# Patient Record
Sex: FEMALE | Race: OTHER | HISPANIC OR LATINO | URBAN - METROPOLITAN AREA
[De-identification: names, ages, dates, MRNs, and addresses within clinical notes are randomized per-mention and may not be internally consistent; named-entity substitution may affect disease eponyms.]

---

## 2017-04-21 ENCOUNTER — EMERGENCY (EMERGENCY)
Facility: HOSPITAL | Age: 47
LOS: 1 days | Discharge: HOME | End: 2017-04-21
Attending: EMERGENCY MEDICINE
Payer: MEDICAID

## 2017-04-21 VITALS
HEIGHT: 61 IN | TEMPERATURE: 98 F | WEIGHT: 138.01 LBS | HEART RATE: 71 BPM | RESPIRATION RATE: 18 BRPM | SYSTOLIC BLOOD PRESSURE: 100 MMHG | DIASTOLIC BLOOD PRESSURE: 76 MMHG | OXYGEN SATURATION: 98 %

## 2017-04-21 DIAGNOSIS — Z98.82 BREAST IMPLANT STATUS: Chronic | ICD-10-CM

## 2017-04-21 PROCEDURE — 99282 EMERGENCY DEPT VISIT SF MDM: CPT

## 2017-04-21 PROCEDURE — 93005 ELECTROCARDIOGRAM TRACING: CPT

## 2017-04-21 PROCEDURE — 99283 EMERGENCY DEPT VISIT LOW MDM: CPT

## 2017-04-21 NOTE — ED PROVIDER NOTE - OBJECTIVE STATEMENT
46 y/o F pt with PMHx of Heart Murmur and no PSHx presents to ED c/o non-productive cough, congestion, diarrhea, and general body aches x1 week. Pt reports that she cannot stop coughing, and every time she coughs, she has pain in her chest and head. Pt denies fever, chills, abd pain, or any other complaints. NKDA.

## 2017-04-21 NOTE — ED PROVIDER NOTE - MEDICAL DECISION MAKING DETAILS
48 y/o F pt c/o non-productive cough, congestion, diarrhea, and general body aches x1 week. Likely viral. Plan to give Ibuprofen, instruct on supportive care, and d/c home.

## 2017-04-21 NOTE — ED PROVIDER NOTE - NS ED MD SCRIBE ATTENDING SCRIBE SECTIONS
PAST MEDICAL/SURGICAL/SOCIAL HISTORY/VITAL SIGNS( Pullset)/HISTORY OF PRESENT ILLNESS/DISPOSITION/REVIEW OF SYSTEMS/HIV/PHYSICAL EXAM

## 2017-04-24 DIAGNOSIS — R19.7 DIARRHEA, UNSPECIFIED: ICD-10-CM

## 2017-04-24 DIAGNOSIS — B34.9 VIRAL INFECTION, UNSPECIFIED: ICD-10-CM

## 2019-12-31 ENCOUNTER — EMERGENCY (EMERGENCY)
Facility: HOSPITAL | Age: 49
LOS: 1 days | Discharge: HOME | End: 2019-12-31
Attending: EMERGENCY MEDICINE
Payer: MEDICAID

## 2019-12-31 VITALS
RESPIRATION RATE: 17 BRPM | SYSTOLIC BLOOD PRESSURE: 104 MMHG | TEMPERATURE: 99 F | OXYGEN SATURATION: 99 % | DIASTOLIC BLOOD PRESSURE: 67 MMHG | HEART RATE: 90 BPM

## 2019-12-31 VITALS
OXYGEN SATURATION: 96 % | TEMPERATURE: 97 F | HEART RATE: 92 BPM | HEIGHT: 60 IN | DIASTOLIC BLOOD PRESSURE: 71 MMHG | SYSTOLIC BLOOD PRESSURE: 110 MMHG | WEIGHT: 147.93 LBS | RESPIRATION RATE: 16 BRPM

## 2019-12-31 DIAGNOSIS — Z98.82 BREAST IMPLANT STATUS: Chronic | ICD-10-CM

## 2019-12-31 PROBLEM — R01.1 CARDIAC MURMUR, UNSPECIFIED: Chronic | Status: ACTIVE | Noted: 2017-04-21

## 2019-12-31 LAB
ALBUMIN SERPL ELPH-MCNC: 3.5 G/DL — SIGNIFICANT CHANGE UP (ref 3.5–5)
ALP SERPL-CCNC: 84 U/L — SIGNIFICANT CHANGE UP (ref 40–120)
ALT FLD-CCNC: 23 U/L DA — SIGNIFICANT CHANGE UP (ref 10–60)
ANION GAP SERPL CALC-SCNC: 5 MMOL/L — SIGNIFICANT CHANGE UP (ref 5–17)
APPEARANCE UR: CLEAR — SIGNIFICANT CHANGE UP
APTT BLD: 30.4 SEC — SIGNIFICANT CHANGE UP (ref 27.5–36.3)
AST SERPL-CCNC: 23 U/L — SIGNIFICANT CHANGE UP (ref 10–40)
BILIRUB SERPL-MCNC: 0.3 MG/DL — SIGNIFICANT CHANGE UP (ref 0.2–1.2)
BILIRUB UR-MCNC: NEGATIVE — SIGNIFICANT CHANGE UP
BUN SERPL-MCNC: 6 MG/DL — LOW (ref 7–18)
CALCIUM SERPL-MCNC: 8.8 MG/DL — SIGNIFICANT CHANGE UP (ref 8.4–10.5)
CHLORIDE SERPL-SCNC: 105 MMOL/L — SIGNIFICANT CHANGE UP (ref 96–108)
CO2 SERPL-SCNC: 29 MMOL/L — SIGNIFICANT CHANGE UP (ref 22–31)
COLOR SPEC: YELLOW — SIGNIFICANT CHANGE UP
CREAT SERPL-MCNC: 0.92 MG/DL — SIGNIFICANT CHANGE UP (ref 0.5–1.3)
D DIMER BLD IA.RAPID-MCNC: <150 NG/ML DDU — SIGNIFICANT CHANGE UP
DIFF PNL FLD: NEGATIVE — SIGNIFICANT CHANGE UP
GLUCOSE SERPL-MCNC: 90 MG/DL — SIGNIFICANT CHANGE UP (ref 70–99)
GLUCOSE UR QL: NEGATIVE — SIGNIFICANT CHANGE UP
HCG UR QL: NEGATIVE — SIGNIFICANT CHANGE UP
HCT VFR BLD CALC: 38.1 % — SIGNIFICANT CHANGE UP (ref 34.5–45)
HGB BLD-MCNC: 12 G/DL — SIGNIFICANT CHANGE UP (ref 11.5–15.5)
INR BLD: 1 RATIO — SIGNIFICANT CHANGE UP (ref 0.88–1.16)
KETONES UR-MCNC: NEGATIVE — SIGNIFICANT CHANGE UP
LEUKOCYTE ESTERASE UR-ACNC: NEGATIVE — SIGNIFICANT CHANGE UP
MCHC RBC-ENTMCNC: 29.6 PG — SIGNIFICANT CHANGE UP (ref 27–34)
MCHC RBC-ENTMCNC: 31.5 GM/DL — LOW (ref 32–36)
MCV RBC AUTO: 94.1 FL — SIGNIFICANT CHANGE UP (ref 80–100)
NITRITE UR-MCNC: NEGATIVE — SIGNIFICANT CHANGE UP
NRBC # BLD: 0 /100 WBCS — SIGNIFICANT CHANGE UP (ref 0–0)
NT-PROBNP SERPL-SCNC: 103 PG/ML — SIGNIFICANT CHANGE UP (ref 0–125)
PH UR: 7 — SIGNIFICANT CHANGE UP (ref 5–8)
PLATELET # BLD AUTO: 195 K/UL — SIGNIFICANT CHANGE UP (ref 150–400)
POTASSIUM SERPL-MCNC: 3.9 MMOL/L — SIGNIFICANT CHANGE UP (ref 3.5–5.3)
POTASSIUM SERPL-SCNC: 3.9 MMOL/L — SIGNIFICANT CHANGE UP (ref 3.5–5.3)
PROT SERPL-MCNC: 7.2 G/DL — SIGNIFICANT CHANGE UP (ref 6–8.3)
PROT UR-MCNC: NEGATIVE — SIGNIFICANT CHANGE UP
PROTHROM AB SERPL-ACNC: 11.1 SEC — SIGNIFICANT CHANGE UP (ref 10–12.9)
RBC # BLD: 4.05 M/UL — SIGNIFICANT CHANGE UP (ref 3.8–5.2)
RBC # FLD: 13.4 % — SIGNIFICANT CHANGE UP (ref 10.3–14.5)
SODIUM SERPL-SCNC: 139 MMOL/L — SIGNIFICANT CHANGE UP (ref 135–145)
SP GR SPEC: 1 — LOW (ref 1.01–1.02)
TROPONIN I SERPL-MCNC: <0.015 NG/ML — SIGNIFICANT CHANGE UP (ref 0–0.04)
UROBILINOGEN FLD QL: NEGATIVE — SIGNIFICANT CHANGE UP
WBC # BLD: 3.31 K/UL — LOW (ref 3.8–10.5)
WBC # FLD AUTO: 3.31 K/UL — LOW (ref 3.8–10.5)

## 2019-12-31 PROCEDURE — 85730 THROMBOPLASTIN TIME PARTIAL: CPT

## 2019-12-31 PROCEDURE — 81003 URINALYSIS AUTO W/O SCOPE: CPT

## 2019-12-31 PROCEDURE — 84484 ASSAY OF TROPONIN QUANT: CPT

## 2019-12-31 PROCEDURE — 99284 EMERGENCY DEPT VISIT MOD MDM: CPT

## 2019-12-31 PROCEDURE — 94640 AIRWAY INHALATION TREATMENT: CPT

## 2019-12-31 PROCEDURE — 81025 URINE PREGNANCY TEST: CPT

## 2019-12-31 PROCEDURE — 85379 FIBRIN DEGRADATION QUANT: CPT

## 2019-12-31 PROCEDURE — 99284 EMERGENCY DEPT VISIT MOD MDM: CPT | Mod: 25

## 2019-12-31 PROCEDURE — 83880 ASSAY OF NATRIURETIC PEPTIDE: CPT

## 2019-12-31 PROCEDURE — 71046 X-RAY EXAM CHEST 2 VIEWS: CPT

## 2019-12-31 PROCEDURE — 36415 COLL VENOUS BLD VENIPUNCTURE: CPT

## 2019-12-31 PROCEDURE — 85027 COMPLETE CBC AUTOMATED: CPT

## 2019-12-31 PROCEDURE — 80053 COMPREHEN METABOLIC PANEL: CPT

## 2019-12-31 PROCEDURE — 93005 ELECTROCARDIOGRAM TRACING: CPT

## 2019-12-31 PROCEDURE — 85610 PROTHROMBIN TIME: CPT

## 2019-12-31 PROCEDURE — 71046 X-RAY EXAM CHEST 2 VIEWS: CPT | Mod: 26

## 2019-12-31 RX ORDER — SODIUM CHLORIDE 9 MG/ML
1000 INJECTION INTRAMUSCULAR; INTRAVENOUS; SUBCUTANEOUS ONCE
Refills: 0 | Status: COMPLETED | OUTPATIENT
Start: 2019-12-31 | End: 2019-12-31

## 2019-12-31 RX ORDER — CLARITHROMYCIN 500 MG
1 TABLET ORAL
Qty: 4 | Refills: 0
Start: 2019-12-31 | End: 2020-01-03

## 2019-12-31 RX ORDER — AZITHROMYCIN 500 MG/1
1 TABLET, FILM COATED ORAL
Qty: 4 | Refills: 0
Start: 2019-12-31 | End: 2020-01-03

## 2019-12-31 RX ORDER — AZITHROMYCIN 500 MG/1
500 TABLET, FILM COATED ORAL ONCE
Refills: 0 | Status: COMPLETED | OUTPATIENT
Start: 2019-12-31 | End: 2019-12-31

## 2019-12-31 RX ORDER — IPRATROPIUM/ALBUTEROL SULFATE 18-103MCG
3 AEROSOL WITH ADAPTER (GRAM) INHALATION ONCE
Refills: 0 | Status: COMPLETED | OUTPATIENT
Start: 2019-12-31 | End: 2019-12-31

## 2019-12-31 RX ADMIN — SODIUM CHLORIDE 1000 MILLILITER(S): 9 INJECTION INTRAMUSCULAR; INTRAVENOUS; SUBCUTANEOUS at 17:54

## 2019-12-31 RX ADMIN — Medication 200 MILLIGRAM(S): at 17:51

## 2019-12-31 RX ADMIN — AZITHROMYCIN 500 MILLIGRAM(S): 500 TABLET, FILM COATED ORAL at 17:53

## 2019-12-31 RX ADMIN — Medication 3 MILLILITER(S): at 17:51

## 2019-12-31 NOTE — ED PROVIDER NOTE - CLINICAL SUMMARY MEDICAL DECISION MAKING FREE TEXT BOX
50 y/o F presents to ED complaining of cough with blood tinged sputum and chest pain. Will give labs, coagulations, chest x-ray and will reassess.

## 2019-12-31 NOTE — ED ADULT NURSE NOTE - NSIMPLEMENTINTERV_GEN_ALL_ED
Implemented All Fall Risk Interventions:  [place] to call system. Call bell, personal items and telephone within reach. Instruct patient to call for assistance. Room bathroom lighting operational. Non-slip footwear when patient is off stretcher. Physically safe environment: no spills, clutter or unnecessary equipment. Stretcher in lowest position, wheels locked, appropriate side rails in place. Provide visual cue, wrist band, yellow gown, etc. Monitor gait and stability. Monitor for mental status changes and reorient to person, place, and time. Review medications for side effects contributing to fall risk. Reinforce activity limits and safety measures with patient and family.

## 2019-12-31 NOTE — ED PROVIDER NOTE - PROGRESS NOTE DETAILS
Pt is not hypoxic, not tachy, possible small retrocardiac infiltrate, will dc to follow up with pulmonologist. Precautions reviewed. Pt is not hypoxic, not tachy, xray noted,, will dc to follow up with pulmonologist. Precautions reviewed.

## 2019-12-31 NOTE — ED PROVIDER NOTE - PATIENT PORTAL LINK FT
You can access the FollowMyHealth Patient Portal offered by [facility] by registering at the following website: [email or web address]. By joining [facility]’s FollowMyHealth portal, you will also be able to view your health information using other applications (apps) compatible with our system.

## 2019-12-31 NOTE — ED PROVIDER NOTE - NS ED SCRIBE STATEMENT
Patient called to set up a follow up appointment with Dr. Weeks as well as asking him if there is anything that she could take to stop the ringing in her ears. She was notified that he was out on leave and that we would have to give her a call back regarding her scheduling options     Attending

## 2020-01-01 ENCOUNTER — OUTPATIENT (OUTPATIENT)
Dept: OUTPATIENT SERVICES | Facility: HOSPITAL | Age: 50
LOS: 1 days | End: 2020-01-01
Payer: MEDICAID

## 2020-01-01 DIAGNOSIS — Z98.82 BREAST IMPLANT STATUS: Chronic | ICD-10-CM

## 2020-01-01 PROCEDURE — G9001: CPT

## 2020-01-08 DIAGNOSIS — Z71.89 OTHER SPECIFIED COUNSELING: ICD-10-CM

## 2020-02-16 ENCOUNTER — EMERGENCY (EMERGENCY)
Facility: HOSPITAL | Age: 50
LOS: 1 days | Discharge: HOME | End: 2020-02-16
Attending: EMERGENCY MEDICINE
Payer: MEDICAID

## 2020-02-16 VITALS
DIASTOLIC BLOOD PRESSURE: 72 MMHG | SYSTOLIC BLOOD PRESSURE: 115 MMHG | TEMPERATURE: 98 F | OXYGEN SATURATION: 98 % | RESPIRATION RATE: 16 BRPM | HEART RATE: 74 BPM

## 2020-02-16 VITALS
WEIGHT: 139.99 LBS | RESPIRATION RATE: 16 BRPM | SYSTOLIC BLOOD PRESSURE: 110 MMHG | OXYGEN SATURATION: 99 % | HEART RATE: 91 BPM | DIASTOLIC BLOOD PRESSURE: 78 MMHG | TEMPERATURE: 98 F | HEIGHT: 61 IN

## 2020-02-16 DIAGNOSIS — Z98.82 BREAST IMPLANT STATUS: Chronic | ICD-10-CM

## 2020-02-16 LAB — HCG UR QL: NEGATIVE — SIGNIFICANT CHANGE UP

## 2020-02-16 PROCEDURE — 72110 X-RAY EXAM L-2 SPINE 4/>VWS: CPT | Mod: 26

## 2020-02-16 PROCEDURE — 99284 EMERGENCY DEPT VISIT MOD MDM: CPT

## 2020-02-16 PROCEDURE — 72110 X-RAY EXAM L-2 SPINE 4/>VWS: CPT

## 2020-02-16 PROCEDURE — 81025 URINE PREGNANCY TEST: CPT

## 2020-02-16 PROCEDURE — 99283 EMERGENCY DEPT VISIT LOW MDM: CPT | Mod: 25

## 2020-02-16 PROCEDURE — 96372 THER/PROPH/DIAG INJ SC/IM: CPT

## 2020-02-16 RX ORDER — LIDOCAINE 4 G/100G
1 CREAM TOPICAL ONCE
Refills: 0 | Status: COMPLETED | OUTPATIENT
Start: 2020-02-16 | End: 2020-02-16

## 2020-02-16 RX ORDER — CYCLOBENZAPRINE HYDROCHLORIDE 10 MG/1
1 TABLET, FILM COATED ORAL
Qty: 9 | Refills: 0
Start: 2020-02-16 | End: 2020-02-18

## 2020-02-16 RX ORDER — IBUPROFEN 200 MG
1 TABLET ORAL
Qty: 12 | Refills: 0
Start: 2020-02-16 | End: 2020-02-18

## 2020-02-16 RX ORDER — KETOROLAC TROMETHAMINE 30 MG/ML
15 SYRINGE (ML) INJECTION ONCE
Refills: 0 | Status: DISCONTINUED | OUTPATIENT
Start: 2020-02-16 | End: 2020-02-16

## 2020-02-16 RX ORDER — CYCLOBENZAPRINE HYDROCHLORIDE 10 MG/1
10 TABLET, FILM COATED ORAL ONCE
Refills: 0 | Status: COMPLETED | OUTPATIENT
Start: 2020-02-16 | End: 2020-02-16

## 2020-02-16 RX ORDER — ACETAMINOPHEN 500 MG
650 TABLET ORAL ONCE
Refills: 0 | Status: COMPLETED | OUTPATIENT
Start: 2020-02-16 | End: 2020-02-16

## 2020-02-16 RX ADMIN — Medication 15 MILLIGRAM(S): at 16:10

## 2020-02-16 RX ADMIN — LIDOCAINE 1 PATCH: 4 CREAM TOPICAL at 15:54

## 2020-02-16 RX ADMIN — Medication 15 MILLIGRAM(S): at 15:55

## 2020-02-16 RX ADMIN — Medication 650 MILLIGRAM(S): at 15:55

## 2020-02-16 RX ADMIN — CYCLOBENZAPRINE HYDROCHLORIDE 10 MILLIGRAM(S): 10 TABLET, FILM COATED ORAL at 15:55

## 2020-02-16 RX ADMIN — Medication 650 MILLIGRAM(S): at 16:00

## 2020-02-16 NOTE — ED PROVIDER NOTE - PHYSICAL EXAMINATION
back: Paraspinal tenderness to lumbar region, worse on left.  Able to ambulate with assistance.  Pain worse with right leg strait raise.

## 2020-02-16 NOTE — ED PROVIDER NOTE - CLINICAL SUMMARY MEDICAL DECISION MAKING FREE TEXT BOX
49 year old female no significant past medical history presents with lower back pain 2 days after falling onto back.  Will do XR lumbar spine, meds, reassess.

## 2020-02-16 NOTE — ED PROVIDER NOTE - ATTENDING CONTRIBUTION TO CARE
I completed an independent physical examination.   I have signed out the follow up of any pending tests (i.e. labs, radiological studies) to the PA/NP.  I have discussed the patient’s plan of care and disposition with the PA/NP    Lower back pain s/p fall. X-ray, analgesia, reassess.

## 2020-02-16 NOTE — ED PROVIDER NOTE - OBJECTIVE STATEMENT
49 year old female no significant past medical history presents with lower back pain 2 days after falling onto back.  Patient states she tripped in a crack in the side walk, tried to steady herself with her lower back, strained it then fell onto her lower back.  Patient denies hitting head or neck pain.  Patient states that pain has been progressing in intensity and is now having difficulty ambulating 2/2 pain. Patient denies loss of bladder of bowel function, denies fevers or chills and denies saddle anesthesia.  States that she intermittently has numbness going down both legs.

## 2020-02-16 NOTE — ED PROVIDER NOTE - PATIENT PORTAL LINK FT
You can access the FollowMyHealth Patient Portal offered by [facility] by registering at the following website: [email or web address]. By joining [facility]’s FollowMyHealth portal, you will also be able to view your health information using other applications (apps) compatible with our system.

## 2020-02-16 NOTE — ED ADULT NURSE NOTE - OBJECTIVE STATEMENT
Pt c/o back pain s/p trip and fall 2 days ago, denies any LOC. No acute distress noted, denies chest pain, no shortness of breath indicated. Safety maintained.

## 2020-12-16 NOTE — ED PROVIDER NOTE - RADIATION
Letter of medical necessity for [place] upgrade was written by Stephanie Moncada and signed by Bertha Patten MD.  I sent the letter to MED  today on behalf of Dr. Hill.      Demarco Miller, CCC-A, [facility]  Licensed Audiologist  [place] #[id number]    
no radiation

## 2022-11-07 NOTE — ED ADULT TRIAGE NOTE - BP NONINVASIVE DIASTOLIC (MM HG)
Can you let him know that his blood work shows anemia is improving. He has moderate iron deficiency despite the fact that he has been taking oral iron. I would recommend him to get one dose of feraheme and have virtual visit with me in 3 months with CBC, ferritin, TIBC and retic couple of days prior.   Plan for feraheme placed.     Thank you.   Reanna Smith MD  [address]

## 2024-04-24 NOTE — ED ADULT TRIAGE NOTE - RELATIONSHIP TO PATIENT
[facility]        NAME: Jennyfer Glez is a 19 y.o. female  : 2005    MRN: [id number]  DATE: 2024  TIME: 6:59 PM    Assessment and Plan   Viral infection [B34.9]  1. Viral infection  methylPREDNISolone 4 MG tablet therapy pack    ondansetron (ZOFRAN) 4 mg tablet            Patient Instructions     Discussed BRAT diet. Recommended maintaining hydration. Steroids given for headaches and muscle aches. Zofran for nausea. Follow-up with PCP in the next 1-2 days for re-evaluation if symptoms continue or worsen .Go to the ED if any fevers, malaise, new or worsening symptoms or other concerning symptoms.     Chief Complaint     Chief Complaint   Patient presents with   • Flu Symptoms     Pt reports that she has been feeling sick for about 3 days. She stated that she has been feeling nausea/body aches/fevers/chills/ and she has taken OTC meds.          History of Present Illness     Jennyfer Glez is a 19 y.o. female presenting to the office today for abdominal pain. Symptoms have been present for 3 days, and include headaches, muscle aches, epigastric pain, nausea, vomiting and fatigue. She is unsure if she is having fevers.     Review of Systems     Review of Systems   Constitutional:  Positive for fatigue. Negative for chills and fever.   HENT:  Positive for congestion. Negative for postnasal drip and sore throat.    Respiratory:  Negative for cough and shortness of breath.    Cardiovascular:  Negative for chest pain and palpitations.   Gastrointestinal:  Positive for abdominal pain, nausea and vomiting. Negative for blood in stool, constipation and diarrhea.   Genitourinary:  Negative for dysuria.   Musculoskeletal:  Positive for arthralgias and myalgias. Negative for back pain, neck pain and neck stiffness.   Skin:  Negative for rash.   Allergic/Immunologic: Negative for food allergies.   Neurological:  Positive for headaches. Negative for dizziness, syncope and light-headedness.    Psychiatric/Behavioral:  Negative for agitation and confusion.    All other systems reviewed and are negative.      Current Medications       Current Outpatient Medications:   •  Levonorgestrel (MIRENA) 20 MCG/DAY IUD, 1 each by Intrauterine Device route Once every 8 years, Disp: , Rfl:   •  methylPREDNISolone 4 MG tablet therapy pack, Use as directed on package, Disp: 21 tablet, Rfl: 0  •  ondansetron (ZOFRAN) 4 mg tablet, Take 1 tablet (4 mg total) by mouth every 8 (eight) hours as needed for nausea or vomiting, Disp: 20 tablet, Rfl: 0    Current Allergies     Allergies as of 2024   • (No Known Allergies)            The following portions of the patient's history were reviewed and updated as appropriate: allergies, current medications, past family history, past medical history, past social history, past surgical history and problem list.     History reviewed. No pertinent past medical history.    Past Surgical History:   Procedure Laterality Date   •  SECTION  2021       History reviewed. No pertinent family history.    Medications have been verified.    Objective     /64   Pulse 58   Temp 98.5 °F (36.9 °C)   Resp 16   SpO2 99%   No LMP recorded.     Physical Exam     Physical Exam  Vitals reviewed.   Constitutional:       General: She is not in acute distress.     Appearance: Normal appearance. She is not ill-appearing.   HENT:      Head: Normocephalic and atraumatic.      Right Ear: Tympanic membrane and ear canal normal. No middle ear effusion.      Left Ear: Tympanic membrane and ear canal normal.  No middle ear effusion.      Mouth/Throat:      Mouth: Mucous membranes are moist.      Pharynx: No oropharyngeal exudate or posterior oropharyngeal erythema.      Tonsils: No tonsillar exudate.   Eyes:      Extraocular Movements: Extraocular movements intact.      Conjunctiva/sclera: Conjunctivae normal.      Pupils: Pupils are equal, round, and reactive to light.   Cardiovascular:       Rate and Rhythm: Normal rate and regular rhythm.      Pulses: Normal pulses.      Heart sounds: Normal heart sounds. No murmur heard.  Pulmonary:      Effort: Pulmonary effort is normal. No respiratory distress.      Breath sounds: Normal breath sounds. No wheezing.   Abdominal:      General: Abdomen is flat. Bowel sounds are normal.      Palpations: Abdomen is soft.      Tenderness: There is abdominal tenderness in the epigastric area.   Musculoskeletal:      Cervical back: Normal range of motion and neck supple. No tenderness.   Skin:     General: Skin is warm.   Neurological:      General: No focal deficit present.      Mental Status: She is alert.   Psychiatric:         Mood and Affect: Mood normal.         Behavior: Behavior normal.         Judgment: Judgment normal.                      daughter

## 2024-06-12 ENCOUNTER — INPATIENT (INPATIENT)
Facility: HOSPITAL | Age: 54
LOS: 1 days | Discharge: HOME | DRG: 948 | End: 2024-06-14
Attending: STUDENT IN AN ORGANIZED HEALTH CARE EDUCATION/TRAINING PROGRAM | Admitting: STUDENT IN AN ORGANIZED HEALTH CARE EDUCATION/TRAINING PROGRAM
Payer: MEDICAID

## 2024-06-12 VITALS
HEART RATE: 87 BPM | WEIGHT: 141.1 LBS | HEIGHT: 61 IN | SYSTOLIC BLOOD PRESSURE: 116 MMHG | TEMPERATURE: 98 F | RESPIRATION RATE: 18 BRPM | DIASTOLIC BLOOD PRESSURE: 81 MMHG | OXYGEN SATURATION: 95 %

## 2024-06-12 DIAGNOSIS — R53.1 WEAKNESS: ICD-10-CM

## 2024-06-12 DIAGNOSIS — Z98.82 BREAST IMPLANT STATUS: Chronic | ICD-10-CM

## 2024-06-12 LAB
ALBUMIN SERPL ELPH-MCNC: 4 G/DL — SIGNIFICANT CHANGE UP (ref 3.5–5)
ALP SERPL-CCNC: 110 U/L — SIGNIFICANT CHANGE UP (ref 40–120)
ALT FLD-CCNC: 27 U/L DA — SIGNIFICANT CHANGE UP (ref 10–60)
ANION GAP SERPL CALC-SCNC: 4 MMOL/L — LOW (ref 5–17)
APTT BLD: 35.4 SEC — SIGNIFICANT CHANGE UP (ref 24.5–35.6)
AST SERPL-CCNC: 19 U/L — SIGNIFICANT CHANGE UP (ref 10–40)
BASOPHILS # BLD AUTO: 0.04 K/UL — SIGNIFICANT CHANGE UP (ref 0–0.2)
BASOPHILS NFR BLD AUTO: 0.7 % — SIGNIFICANT CHANGE UP (ref 0–2)
BILIRUB SERPL-MCNC: 0.3 MG/DL — SIGNIFICANT CHANGE UP (ref 0.2–1.2)
BUN SERPL-MCNC: 11 MG/DL — SIGNIFICANT CHANGE UP (ref 7–18)
CALCIUM SERPL-MCNC: 9.2 MG/DL — SIGNIFICANT CHANGE UP (ref 8.4–10.5)
CHLORIDE SERPL-SCNC: 109 MMOL/L — HIGH (ref 96–108)
CO2 SERPL-SCNC: 27 MMOL/L — SIGNIFICANT CHANGE UP (ref 22–31)
CREAT SERPL-MCNC: 0.79 MG/DL — SIGNIFICANT CHANGE UP (ref 0.5–1.3)
EGFR: 88 ML/MIN/1.73M2 — SIGNIFICANT CHANGE UP
EOSINOPHIL # BLD AUTO: 0.07 K/UL — SIGNIFICANT CHANGE UP (ref 0–0.5)
EOSINOPHIL NFR BLD AUTO: 1.2 % — SIGNIFICANT CHANGE UP (ref 0–6)
GLUCOSE SERPL-MCNC: 87 MG/DL — SIGNIFICANT CHANGE UP (ref 70–99)
HCG SERPL-ACNC: 1 MIU/ML — SIGNIFICANT CHANGE UP
HCT VFR BLD CALC: 39 % — SIGNIFICANT CHANGE UP (ref 34.5–45)
HGB BLD-MCNC: 12.9 G/DL — SIGNIFICANT CHANGE UP (ref 11.5–15.5)
IMM GRANULOCYTES NFR BLD AUTO: 0 % — SIGNIFICANT CHANGE UP (ref 0–0.9)
INR BLD: 0.95 RATIO — SIGNIFICANT CHANGE UP (ref 0.85–1.18)
LYMPHOCYTES # BLD AUTO: 2.78 K/UL — SIGNIFICANT CHANGE UP (ref 1–3.3)
LYMPHOCYTES # BLD AUTO: 46.3 % — HIGH (ref 13–44)
MCHC RBC-ENTMCNC: 31.2 PG — SIGNIFICANT CHANGE UP (ref 27–34)
MCHC RBC-ENTMCNC: 33.1 GM/DL — SIGNIFICANT CHANGE UP (ref 32–36)
MCV RBC AUTO: 94.4 FL — SIGNIFICANT CHANGE UP (ref 80–100)
MONOCYTES # BLD AUTO: 0.47 K/UL — SIGNIFICANT CHANGE UP (ref 0–0.9)
MONOCYTES NFR BLD AUTO: 7.8 % — SIGNIFICANT CHANGE UP (ref 2–14)
NEUTROPHILS # BLD AUTO: 2.65 K/UL — SIGNIFICANT CHANGE UP (ref 1.8–7.4)
NEUTROPHILS NFR BLD AUTO: 44 % — SIGNIFICANT CHANGE UP (ref 43–77)
NRBC # BLD: 0 /100 WBCS — SIGNIFICANT CHANGE UP (ref 0–0)
PLATELET # BLD AUTO: 250 K/UL — SIGNIFICANT CHANGE UP (ref 150–400)
POTASSIUM SERPL-MCNC: 3.8 MMOL/L — SIGNIFICANT CHANGE UP (ref 3.5–5.3)
POTASSIUM SERPL-SCNC: 3.8 MMOL/L — SIGNIFICANT CHANGE UP (ref 3.5–5.3)
PROT SERPL-MCNC: 7.6 G/DL — SIGNIFICANT CHANGE UP (ref 6–8.3)
PROTHROM AB SERPL-ACNC: 10.8 SEC — SIGNIFICANT CHANGE UP (ref 9.5–13)
RBC # BLD: 4.13 M/UL — SIGNIFICANT CHANGE UP (ref 3.8–5.2)
RBC # FLD: 12.9 % — SIGNIFICANT CHANGE UP (ref 10.3–14.5)
SODIUM SERPL-SCNC: 140 MMOL/L — SIGNIFICANT CHANGE UP (ref 135–145)
TROPONIN I, HIGH SENSITIVITY RESULT: 31.4 NG/L — SIGNIFICANT CHANGE UP
WBC # BLD: 6.01 K/UL — SIGNIFICANT CHANGE UP (ref 3.8–10.5)
WBC # FLD AUTO: 6.01 K/UL — SIGNIFICANT CHANGE UP (ref 3.8–10.5)

## 2024-06-12 PROCEDURE — 70450 CT HEAD/BRAIN W/O DYE: CPT | Mod: 26,MC,59

## 2024-06-12 PROCEDURE — 70496 CT ANGIOGRAPHY HEAD: CPT | Mod: 26,MC

## 2024-06-12 PROCEDURE — 74177 CT ABD & PELVIS W/CONTRAST: CPT | Mod: 26,MC

## 2024-06-12 PROCEDURE — 71260 CT THORAX DX C+: CPT | Mod: 26,MC

## 2024-06-12 PROCEDURE — 99222 1ST HOSP IP/OBS MODERATE 55: CPT | Mod: GC

## 2024-06-12 PROCEDURE — 70498 CT ANGIOGRAPHY NECK: CPT | Mod: 26,MC

## 2024-06-12 PROCEDURE — 99291 CRITICAL CARE FIRST HOUR: CPT

## 2024-06-12 PROCEDURE — 0042T: CPT | Mod: MC

## 2024-06-12 RX ORDER — MIDAZOLAM HYDROCHLORIDE 1 MG/ML
1 INJECTION, SOLUTION INTRAMUSCULAR; INTRAVENOUS ONCE
Refills: 0 | Status: DISCONTINUED | OUTPATIENT
Start: 2024-06-12 | End: 2024-06-12

## 2024-06-12 RX ORDER — ACETAMINOPHEN 500 MG
650 TABLET ORAL ONCE
Refills: 0 | Status: COMPLETED | OUTPATIENT
Start: 2024-06-12 | End: 2024-06-12

## 2024-06-12 RX ADMIN — Medication 650 MILLIGRAM(S): at 23:44

## 2024-06-12 RX ADMIN — MIDAZOLAM HYDROCHLORIDE 1 MILLIGRAM(S): 1 INJECTION, SOLUTION INTRAMUSCULAR; INTRAVENOUS at 20:59

## 2024-06-12 NOTE — H&P ADULT - PROBLEM SELECTOR PLAN 1
Pt presents with left sided numbness and weakness, with upper back pain around T7 vertebrae. Sharp pain along digits 3-5 left hand  Code stroke in ED, CT Head, brain perfusion: Vague 4 mm hyperdensity in the right midbrain/cerebral peduncle is of uncertain etiology with possibilities including a tiny focus of acute hemorrhage, dystrophic calcification or cavernoma and cannot entirely excluded it being artifactual. No definite acute hemorrhage, vasogenic edema or extra-axial collection. Consider noncontrast brain MRI for further evaluation.  NIHSS 2 on admission  - C/w Statin  - C/w Neuro checks q4 hours  - C/w TELE monitoring  - F/u Echo w/bubble  - F/u MRI (form faxed and in chart)  - F/u A1c, Lipid Panel  - Hold ASA until repeat imaging  Dr. Joyner Neurosurgery consulted in ED - recommends repeat brain imaging (CTH vs MRI) in AM  Neuro  ___ Consulted Pt presents with left sided numbness and weakness, with upper back pain around T7 vertebrae. Sharp pain along digits 3-5 left hand  Code stroke in ED, CT Head, brain perfusion: Vague 4 mm hyperdensity in the right midbrain/cerebral peduncle is of uncertain etiology with possibilities including a tiny focus of acute hemorrhage, dystrophic calcification or cavernoma and cannot entirely excluded it being artifactual. No definite acute hemorrhage, vasogenic edema or extra-axial collection. Consider noncontrast brain MRI for further evaluation.  NIHSS 2 on admission  - C/w Statin  - C/w Neuro checks q4 hours  - C/w TELE monitoring  - F/u Echo w/bubble  - F/u MRI (form faxed and in chart)  - F/u A1c, Lipid Panel  - Hold ASA until repeat imaging  Dr. Joyner Neurosurgery consulted in ED - recommends repeat brain imaging (CTH vs MRI) in AM  Neuro Dr. Blanchard Consulted

## 2024-06-12 NOTE — H&P ADULT - NSHPREVIEWOFSYSTEMS_GEN_ALL_CORE
CONSTITUTIONAL: No fever, weight loss, or fatigue  RESPIRATORY: No cough, wheezing, chills or hemoptysis; No shortness of breath  CARDIOVASCULAR: + chest pain, palpitations, dizziness, or leg swelling  GASTROINTESTINAL: No abdominal pain. No nausea, vomiting, or hematemesis; No diarrhea or constipation. No melena or hematochezia.  GENITOURINARY: No dysuria or hematuria, urinary frequency  NEUROLOGICAL: No headaches, memory loss, loss of strength, numbness, or tremors  ENDOCRINE: No polyuria, polydipsia, or heat/cold intolerance  MUSCULOSKELETAL: No muscle aches, joint pains  HEME: no easy bruisability, no tender or enlarged lymph nodes  SKIN: No itching, burning, rashes, or lesions .

## 2024-06-12 NOTE — ED PROVIDER NOTE - PHYSICAL EXAMINATION
Heart regular lungs clear abdomen soft nontender ambulatory with steady gait.  Strength is 5 out of 5 in both legs although patient reports that holding the leg up for 5 seconds feels heavier and more tiring on the left side compared to the right.  Tenderness to palpation to left wrist.  Strength questionably mildly diminished on the left hand and arm compared to the right although unclear whether this is effort related due to the pain.  Patient reports subjectively diminished sensation to the left face left arm and left leg light touch.  Extraocular movements intact ambulatory steady gait.Patient is awake alert speech is clear.  Pulses equal bilaterally.  Good capillary refill abdomen nontender no chest wall tenderness palpation.  Mild tenderness to palpation to right upper and right lower back.

## 2024-06-12 NOTE — H&P ADULT - NSICDXPASTSURGICALHX_GEN_ALL_CORE_FT
PAST SURGICAL HISTORY:  No significant past surgical history     S/P breast augmentation 1999, revised 2014

## 2024-06-12 NOTE — ED PROVIDER NOTE - PROGRESS NOTE DETAILS
patient ambulated to bathroom without difficulty, notes left hand aching and clumsiness, moving left arm without difficulty. thrombolytics not given due to vague and minor symptoms, improving symptoms. noted CT findings, discussed findings with neurosurgery attending Dr Joyner, no need to transfer, recommendation to repeat CT head in the morning. Patient remains awake alert.

## 2024-06-12 NOTE — H&P ADULT - HISTORY OF PRESENT ILLNESS
This is a 55 y/o F, from home, ambulates independently, with PMHx of HLD and heart valve defect (on ASA), who p/w left arm numbness. Pt states she was in her normal state of health this evening when she began having upper back pain along her spine that then radiated to her arm and caused her to have numbness in her left arm. Pt states she started to have weakness in both her left leg and left distal arm. Pt notes she had a warm pain in her hand along the pinky side, up to her third finger. Pt mentions she has some associated double vision and chest tightness. Pt denies any recent travel, recent illness, recent trauma, sick contacts, fever, chills, N/V/D, constipation, SOB, or tingling.

## 2024-06-12 NOTE — ED PROVIDER NOTE - OBJECTIVE STATEMENT
Patient presents with vomiting 54-year-old female with history of cardiac valve abnormalities no surgical intervention so far.  Patient also with hyperlipidemia.  Presents with back pain and upper and lower right side earlier today.  She then took an aspirin.  Around 6:00 she started experiencing pain in the left shoulder and left arm as well as experiencing strange sensation in the left arm left leg and left side of her head.  She describes the symptoms as cramping like in the hand but the whole arm feels heavy and she is having a hard time using the left arm to lift her phone etc.  She also feels a heaviness in her left leg.  Denies any change in vision or speech.

## 2024-06-12 NOTE — H&P ADULT - ASSESSMENT
53 y/o F, from home, ambulates independently, with PMHx of HLD and heart valve defect (on ASA), who p/w left arm numbness. Code stroke in ED, with insignificant CT findings. Possible 4mm hemorrhage vs artifact for which neurosurgery Dr. Joyner was consulted and recommended repeat imaging in the morning. Admitted for CVA r/o.

## 2024-06-12 NOTE — ED ADULT NURSE NOTE - OBJECTIVE STATEMENT
pt c/o back pain that started about 3 hours ago. Pt states about 1 hour ago chest pain began that radiates to the left arm into the left hand. Pt states left arm is numb, tingling and warm. Pt states she cannot close her left hand because it is too painful. Pt has cardiac history. pt states she feels slightly nauseous and slightly dizzy. Pt states she feels slightly short of breath. Pt denies vomiting.

## 2024-06-12 NOTE — H&P ADULT - ATTENDING COMMENTS
IMAGING  CT Head  IMPRESSION:  Vague 4 mm hyperdensity in the right midbrain/cerebral peduncle is of uncertain etiology with possibilities including a tiny focus of acute hemorrhage, dystrophic calcification or cavernoma and cannot entirely excluded it being artifactual. No definite acute hemorrhage, vasogenic edema or extra-axial collection. Consider noncontrast brain MRI for further evaluation.    CTA Head/Neck  CT Perfusion  IMPRESSION:  CT PERFUSION: No core infarct or penumbra of ischemic tissue is identified by CT perfusion.  CT ANGIOGRAPHY NECK: Patent cervical vasculature. No stenosis or dissection.  CT ANGIOGRAPHY BRAIN: No vessel occlusion, flow-limiting stenosis or aneurysm.    CT C/A/P with IV Contrast  IMPRESSION:  No acute finding in the chest, abdomen or pelvis.  Questionable gastric antral thickening versus underdistention.    EKG  Normal Sinus Rhythm, 66 bpm, QTc 415ms, CO 150ms, on my read no ST segment elevation or depression, TWI in V2    HPI  54 year old female patient with pmhx HLD, cardiac valve abnormalities (with no surgical intervention) who presented to the ER due to upper back pain today. She took an aspirin and around 6:00pm she started to have left arm, left leg, and left facial heaviness and decreased sensation.  She also have pain in her left hand. She denies radiculopathy and her left hand numbness and pain and in her third, fourth, and fifth digits. In the ER, CT Head with vague 4 mm hyperdensity in the right midbrain/cerebral peduncle of uncertain etiology.    Review Of Systems included: + decreased sensation, + weakness, + occasional blurry vision, + upper back pain,   No headache, no hoarseness or speech changes, no shortness of breath     #[id number]  Physical Exam  General: Awake, Alert, Oriented  Cardiac: RRR  Pulmonary: CTA b/l  Abdominal: Soft, ND, NT  Neuro: CN II-XII intact except for left sided facial decreased sensation, Muscle Strength +4/5 in left upper and lower extremities, +5/5 in right upper and lower extremities, decreased sensation in left third, fourth, and fifth fingers and left foot compared to right side, Spurling test negative b/l, NIHSS of 2 (slight left arm drift and decreased sensation)  Extremities: No edema b/l    A/P  # Stroke  # 4 mm hyperdensity in the right midbrain/cerebral peduncle  Neurosurgery recommended that can repeat CT Head in the morning  - Consult Neurology  - MRI Brain  - Cardiac Telemetry Monitoring  - ECHO  - Lipid Panel  - A1C  - Hold Aspirin/Plavix due to possible brain bleed given hyperdensity on CT Head  - SCDs  - Atorvastatin  - Neuro-checks  - Elevate Head of Bed  - Maintain blood pressure <140 systolic  - PT Evaluation    # DVT PPx  - SCDs    # FEN  - DASH Diet  - Monitor and replete electrolytes as needed    Previous Admissions Included  2/16/2020: ER Visit for back pain  12/31/2019: ER Visit for bronchitis  4/21/2017: ER Visit for viral infection    Patient case and management was discussed with ER Attending  I did examine all labs (including CBC, PT/INR, APTT, CMP, Troponin, HCG), imaging, prior notes

## 2024-06-12 NOTE — ED ADULT TRIAGE NOTE - CHIEF COMPLAINT QUOTE
Pt reports that she has chest pain radiating to left arm ,started about 1 hour ago. left arm is hot ,and feeling numb , low back pain and hotness  in the back , left side head is hot . pt took  ASA 2 hours ago .pt states that she has cardiac  Valve problem . pt denies SOB,  N/V/D

## 2024-06-12 NOTE — H&P ADULT - NSHPPHYSICALEXAM_GEN_ALL_CORE
GENERAL: NAD, regular build  HEAD:  Atraumatic, Normocephalic  EYES: EOMI, PERRLA, conjunctiva and sclera clear  NECK: Supple  CHEST/LUNG: Clear to auscultation bilaterally, no RRW  HEART: Regular rate and rhythm; No murmurs, rubs, or gallops  ABDOMEN: Soft, Nontender, Nondistended; Bowel sounds present  MSK: spinal tenderness along the T7 vertebrae  EXTREMITIES:  2+ Peripheral Pulses, No edema  PSYCH: AAOx3  NEUROLOGY: left sided numbness from face down to toes, and left hand weakness 4/5, with minor drift not hitting bed (NIHSS 2)  SKIN: No rashes or lesions

## 2024-06-13 DIAGNOSIS — Z29.9 ENCOUNTER FOR PROPHYLACTIC MEASURES, UNSPECIFIED: ICD-10-CM

## 2024-06-13 DIAGNOSIS — E78.5 HYPERLIPIDEMIA, UNSPECIFIED: ICD-10-CM

## 2024-06-13 DIAGNOSIS — I63.9 CEREBRAL INFARCTION, UNSPECIFIED: ICD-10-CM

## 2024-06-13 LAB
A1C WITH ESTIMATED AVERAGE GLUCOSE RESULT: 6 % — HIGH (ref 4–5.6)
ALBUMIN SERPL ELPH-MCNC: 3.5 G/DL — SIGNIFICANT CHANGE UP (ref 3.5–5)
ALP SERPL-CCNC: 102 U/L — SIGNIFICANT CHANGE UP (ref 40–120)
ALT FLD-CCNC: 25 U/L DA — SIGNIFICANT CHANGE UP (ref 10–60)
ANION GAP SERPL CALC-SCNC: 4 MMOL/L — LOW (ref 5–17)
AST SERPL-CCNC: 16 U/L — SIGNIFICANT CHANGE UP (ref 10–40)
BASOPHILS # BLD AUTO: 0.03 K/UL — SIGNIFICANT CHANGE UP (ref 0–0.2)
BASOPHILS NFR BLD AUTO: 0.6 % — SIGNIFICANT CHANGE UP (ref 0–2)
BILIRUB SERPL-MCNC: 0.3 MG/DL — SIGNIFICANT CHANGE UP (ref 0.2–1.2)
BUN SERPL-MCNC: 10 MG/DL — SIGNIFICANT CHANGE UP (ref 7–18)
CALCIUM SERPL-MCNC: 8.7 MG/DL — SIGNIFICANT CHANGE UP (ref 8.4–10.5)
CHLORIDE SERPL-SCNC: 109 MMOL/L — HIGH (ref 96–108)
CHOLEST SERPL-MCNC: 171 MG/DL — SIGNIFICANT CHANGE UP
CO2 SERPL-SCNC: 28 MMOL/L — SIGNIFICANT CHANGE UP (ref 22–31)
CREAT SERPL-MCNC: 0.87 MG/DL — SIGNIFICANT CHANGE UP (ref 0.5–1.3)
EGFR: 79 ML/MIN/1.73M2 — SIGNIFICANT CHANGE UP
EOSINOPHIL # BLD AUTO: 0.08 K/UL — SIGNIFICANT CHANGE UP (ref 0–0.5)
EOSINOPHIL NFR BLD AUTO: 1.7 % — SIGNIFICANT CHANGE UP (ref 0–6)
ESTIMATED AVERAGE GLUCOSE: 126 MG/DL — HIGH (ref 68–114)
GLUCOSE SERPL-MCNC: 93 MG/DL — SIGNIFICANT CHANGE UP (ref 70–99)
HCT VFR BLD CALC: 36.3 % — SIGNIFICANT CHANGE UP (ref 34.5–45)
HDLC SERPL-MCNC: 63 MG/DL — SIGNIFICANT CHANGE UP
HGB BLD-MCNC: 12.1 G/DL — SIGNIFICANT CHANGE UP (ref 11.5–15.5)
IMM GRANULOCYTES NFR BLD AUTO: 0 % — SIGNIFICANT CHANGE UP (ref 0–0.9)
LIPID PNL WITH DIRECT LDL SERPL: 97 MG/DL — SIGNIFICANT CHANGE UP
LYMPHOCYTES # BLD AUTO: 2.3 K/UL — SIGNIFICANT CHANGE UP (ref 1–3.3)
LYMPHOCYTES # BLD AUTO: 49.4 % — HIGH (ref 13–44)
MAGNESIUM SERPL-MCNC: 2.3 MG/DL — SIGNIFICANT CHANGE UP (ref 1.6–2.6)
MCHC RBC-ENTMCNC: 31 PG — SIGNIFICANT CHANGE UP (ref 27–34)
MCHC RBC-ENTMCNC: 33.3 GM/DL — SIGNIFICANT CHANGE UP (ref 32–36)
MCV RBC AUTO: 93.1 FL — SIGNIFICANT CHANGE UP (ref 80–100)
MONOCYTES # BLD AUTO: 0.38 K/UL — SIGNIFICANT CHANGE UP (ref 0–0.9)
MONOCYTES NFR BLD AUTO: 8.2 % — SIGNIFICANT CHANGE UP (ref 2–14)
NEUTROPHILS # BLD AUTO: 1.87 K/UL — SIGNIFICANT CHANGE UP (ref 1.8–7.4)
NEUTROPHILS NFR BLD AUTO: 40.1 % — LOW (ref 43–77)
NON HDL CHOLESTEROL: 108 MG/DL — SIGNIFICANT CHANGE UP
NRBC # BLD: 0 /100 WBCS — SIGNIFICANT CHANGE UP (ref 0–0)
PHOSPHATE SERPL-MCNC: 4.2 MG/DL — SIGNIFICANT CHANGE UP (ref 2.5–4.5)
PLATELET # BLD AUTO: 222 K/UL — SIGNIFICANT CHANGE UP (ref 150–400)
POTASSIUM SERPL-MCNC: 3.9 MMOL/L — SIGNIFICANT CHANGE UP (ref 3.5–5.3)
POTASSIUM SERPL-SCNC: 3.9 MMOL/L — SIGNIFICANT CHANGE UP (ref 3.5–5.3)
PROT SERPL-MCNC: 6.5 G/DL — SIGNIFICANT CHANGE UP (ref 6–8.3)
RBC # BLD: 3.9 M/UL — SIGNIFICANT CHANGE UP (ref 3.8–5.2)
RBC # FLD: 13 % — SIGNIFICANT CHANGE UP (ref 10.3–14.5)
SODIUM SERPL-SCNC: 141 MMOL/L — SIGNIFICANT CHANGE UP (ref 135–145)
TRIGL SERPL-MCNC: 57 MG/DL — SIGNIFICANT CHANGE UP
TSH SERPL-MCNC: 3.19 UU/ML — SIGNIFICANT CHANGE UP (ref 0.34–4.82)
WBC # BLD: 4.66 K/UL — SIGNIFICANT CHANGE UP (ref 3.8–10.5)
WBC # FLD AUTO: 4.66 K/UL — SIGNIFICANT CHANGE UP (ref 3.8–10.5)

## 2024-06-13 PROCEDURE — 99233 SBSQ HOSP IP/OBS HIGH 50: CPT | Mod: GC

## 2024-06-13 PROCEDURE — 99223 1ST HOSP IP/OBS HIGH 75: CPT

## 2024-06-13 PROCEDURE — 72141 MRI NECK SPINE W/O DYE: CPT | Mod: 26

## 2024-06-13 PROCEDURE — 70551 MRI BRAIN STEM W/O DYE: CPT | Mod: 26

## 2024-06-13 RX ORDER — ONDANSETRON 8 MG/1
4 TABLET, FILM COATED ORAL EVERY 8 HOURS
Refills: 0 | Status: DISCONTINUED | OUTPATIENT
Start: 2024-06-13 | End: 2024-06-14

## 2024-06-13 RX ORDER — LANOLIN ALCOHOL/MO/W.PET/CERES
3 CREAM (GRAM) TOPICAL AT BEDTIME
Refills: 0 | Status: DISCONTINUED | OUTPATIENT
Start: 2024-06-13 | End: 2024-06-14

## 2024-06-13 RX ORDER — ACETAMINOPHEN 500 MG
650 TABLET ORAL EVERY 6 HOURS
Refills: 0 | Status: DISCONTINUED | OUTPATIENT
Start: 2024-06-13 | End: 2024-06-14

## 2024-06-13 RX ORDER — MIDAZOLAM HYDROCHLORIDE 1 MG/ML
0.5 INJECTION, SOLUTION INTRAMUSCULAR; INTRAVENOUS ONCE
Refills: 0 | Status: DISCONTINUED | OUTPATIENT
Start: 2024-06-13 | End: 2024-06-13

## 2024-06-13 RX ORDER — ATORVASTATIN CALCIUM 80 MG/1
40 TABLET, FILM COATED ORAL AT BEDTIME
Refills: 0 | Status: DISCONTINUED | OUTPATIENT
Start: 2024-06-13 | End: 2024-06-14

## 2024-06-13 RX ORDER — ENOXAPARIN SODIUM 100 MG/ML
40 INJECTION SUBCUTANEOUS EVERY 24 HOURS
Refills: 0 | Status: DISCONTINUED | OUTPATIENT
Start: 2024-06-13 | End: 2024-06-14

## 2024-06-13 RX ORDER — ASPIRIN/CALCIUM CARB/MAGNESIUM 324 MG
81 TABLET ORAL DAILY
Refills: 0 | Status: DISCONTINUED | OUTPATIENT
Start: 2024-06-13 | End: 2024-06-14

## 2024-06-13 RX ADMIN — ENOXAPARIN SODIUM 40 MILLIGRAM(S): 100 INJECTION SUBCUTANEOUS at 17:11

## 2024-06-13 RX ADMIN — Medication 650 MILLIGRAM(S): at 00:01

## 2024-06-13 RX ADMIN — Medication 0.5 MILLIGRAM(S): at 11:02

## 2024-06-13 RX ADMIN — MIDAZOLAM HYDROCHLORIDE 0.5 MILLIGRAM(S): 1 INJECTION, SOLUTION INTRAMUSCULAR; INTRAVENOUS at 11:35

## 2024-06-13 RX ADMIN — Medication 81 MILLIGRAM(S): at 17:12

## 2024-06-13 RX ADMIN — ATORVASTATIN CALCIUM 40 MILLIGRAM(S): 80 TABLET, FILM COATED ORAL at 23:11

## 2024-06-13 NOTE — PROGRESS NOTE ADULT - PROBLEM SELECTOR PLAN 1
Per [place] as of 3:38 PM yesterday this PA is still in the pending status. [employer] will continue to follow up with insurance until we receive a response.    Pt presents with left sided numbness and weakness, with upper back pain around T7 vertebrae. Sharp pain along digits 3-5 left hand  Code stroke in ED, CT Head, brain perfusion: Vague 4 mm hyperdensity in the right midbrain/cerebral peduncle is of uncertain etiology with possibilities including a tiny focus of acute hemorrhage, dystrophic calcification or cavernoma and cannot entirely excluded it being artifactual. No definite acute hemorrhage, vasogenic edema or extra-axial collection. Consider noncontrast brain MRI for further evaluation.  NIHSS 2 on admission  - C/w Statin  - C/w Neuro checks q4 hours  - C/w TELE monitoring  - F/u Echo w/bubble  - F/u MRI (form faxed and in chart)  - F/u A1c, Lipid Panel  - Hold ASA until repeat imaging  Dr. Joyner Neurosurgery consulted in ED - recommends repeat brain imaging (CTH vs MRI) in AM  Neuro Dr. Blanchard Consulted Pt presents with left sided numbness and weakness, with upper back pain around T7 vertebrae. Sharp pain along digits 3-5 left hand  Code stroke in ED, CT Head, brain perfusion: Vague 4 mm hyperdensity in the right midbrain/cerebral peduncle is of uncertain etiology with possibilities including a tiny focus of acute hemorrhage, dystrophic calcification or cavernoma and cannot entirely excluded it being artifactual. No definite acute hemorrhage, vasogenic edema or extra-axial collection. Consider noncontrast brain MRI for further evaluation.  NIHSS 2 on admission  - C/w Statin  - C/w Neuro checks q4 hours  - C/w TELE monitoring  - F/u Echo w/bubble  - F/u MRI (form faxed and in chart)  - A1c 6.0, Lipid Panel WNL  - MR brain: 1.  No evidence of acute infarct or midline shift. 2.  Chronic ischemic changes  - MR C-spine: 1. No evidence of critical narrowing of the spinal canal. 2. Mild degenerative changes in the c-spine.  - restart ASA   - Neuro Dr. Blanchard Consulted

## 2024-06-13 NOTE — CONSULT NOTE ADULT - SUBJECTIVE AND OBJECTIVE BOX
54 year old woman with cardiac valvulopathy, NOS, HLD, presenting to [facility] with back pain, and L sided sensory and motor symptoms, beginning on 6/12.     The patient reports that she was home with her mother yesterday afternoon.  At about 1600, she was sitting together with her mother, she felt a painful, hot sensation along the L side of her trunk and back.  She points the the posterior shoulder rib cage, and flank as there area that was in pain and feeling warm.  About 1 hour later, she began experiencing a tingling sensation in the L side of the face, and the L arm.  At the same time, she found she was having difficulty walking, and felt the strength in the L leg was weak compared to the R.  She came to the hospital.  Overnight, the pain and heat in the trunk have resolved, however, she continues to have some sensory and motor symptoms on the L.  Her most concerning complaint this AM is a sense of extreme fatigue.      She denies having other neuro complaints, including headache, visual disturbance, speech difficulty facial weakness.  No weakness in the upper extremities, but she does feel cramping in the arm when she flexes the fingers or .  She reports she can walk, but it is not with the usual ease.      She denies having any chest pain, or shortness of breath.  No abdominal pain, diarrhea, or dysuria.  No recent fevers, chills, or weight loss.     PMhx:   Describes cardiac valvulopathy, takes daily ASA  HLD: takes daily atorvastatin    SHx:   Lives with family.  independent at baseline.   Works as home health aid.    Denies ETOH, tobacco, and drug use.      On exam:   GEN: NAD  CV: RRR, S1, S2, holosystolic murmur.   PULM: CTAB  GI: soft, nontender  EXTREM: no CCE  MUSK: no tenderness to palpation, including region in the L shoulder,rib cage, and trunk.   NEURO:   Awake, alert, oriented to month, date, year, normal naming, repetition, and fluency.   PUpils 3-2mm, symmetric, full VF's, normal EOM, no facial weakness, no facial sensory loss, tongue midline, palate symmetric.   MOTOR: normal bulk and tone, no drift, 5/5 to confrontation throughout.   SENSORY: diminished light touch sensation in the L arm and L leg.     54 year old woman with cardiac valvulopathy, NOS, HLD, presenting to [facility] with back pain, and L sided sensory and motor symptoms, beginning on 6/12.     The patient reports that she was home with her mother yesterday afternoon.  At about 1600, she was sitting together with her mother, she felt a painful, hot sensation along the L side of her trunk and back.  She points the the posterior shoulder rib cage, and flank as there area that was in pain and feeling warm.  About 1 hour later, she began experiencing a tingling sensation in the L side of the face, and the L arm.  At the same time, she found she was having difficulty walking, and felt the strength in the L leg was weak compared to the R.  She came to the hospital.  Overnight, the pain and heat in the trunk have resolved, however, she continues to have some sensory and motor symptoms on the L.  Her most concerning complaint this AM is a sense of extreme fatigue.      She denies having other neuro complaints, including headache, visual disturbance, speech difficulty facial weakness.  No weakness in the upper extremities, but she does feel cramping in the arm when she flexes the fingers or .  She reports she can walk, but it is not with the usual ease.      She denies having any chest pain, or shortness of breath.  No abdominal pain, diarrhea, or dysuria.  No recent fevers, chills, or weight loss.     PMhx:   Describes cardiac valvulopathy, takes daily ASA  HLD: takes daily atorvastatin    SHx:   Lives with family.  independent at baseline.   Works as home health aid.    Denies ETOH, tobacco, and drug use.      On exam:   GEN: NAD  CV: RRR, S1, S2, holosystolic murmur.   PULM: CTAB  GI: soft, nontender  EXTREM: no CCE  MUSK: no tenderness to palpation, including region in the L shoulder,rib cage, and trunk.   NEURO:   Awake, alert, oriented to month, date, year, normal naming, repetition, and fluency.   PUpils 3-2mm, symmetric, full VF's, normal EOM, no facial weakness, no facial sensory loss, tongue midline, palate symmetric.   MOTOR: normal bulk and tone, no drift, 5/5 to confrontation throughout.   SENSORY: diminished light touch sensation in the L arm and L leg.    GAIT: narrow based, negative Rhomberg.  Slight dimnished stride on the L.    REFLEXES: 1+ symmetric BB, BR, negative Kohler, 1+ patellar.   COORDINATION: normal FNF

## 2024-06-13 NOTE — PROGRESS NOTE ADULT - ATTENDING COMMENTS
HPI on admission  54 year old female patient with pmhx HLD, cardiac valve abnormalities (with no surgical intervention) who presented to the ER due to upper back pain today. She took an aspirin and around 6:00pm she started to have left arm, left leg, and left facial heaviness and decreased sensation.  She also have pain in her left hand. She denies radiculopathy and her left hand numbness and pain and in her third, fourth, and fifth digits. In the ER, CT Head with vague 4 mm hyperdensity in the right midbrain/cerebral peduncle of uncertain etiology.       Tammy [id number]  Physical Exam  General: Awake, Alert, Oriented  Cardiac: RRR  Pulmonary: CTA b/l  Abdominal: Soft, ND, NT  Neuro: MUSCLE STRENTH EQUAL BILATERALLY UPPER EXTREMITIES EXCEPT FOR THE 4th and 5th digit of the left hand.  Extremities: No edema b/l    A/P  # Stroke  MRI noted not acute stroke  Symtpoms resolved  followup echo  continue asa and statin  dc planning tomorrow  no PT consult needed- ambulates independent;ly    # DVT PPx  - SCDs

## 2024-06-13 NOTE — PROGRESS NOTE ADULT - TIME BILLING
Discussion via , discussion with neurology attending. Review of prior records. Review of labs cbc cmp a1c. review of imaging CT head and mri. Formulation of plan, medication and medical management. coordintion of care. Documentation of encounter

## 2024-06-13 NOTE — CONSULT NOTE ADULT - ASSESSMENT
54 year old woman with cardiac valvulopathy, and HLD, presenting with shoulder and back pain, along with numbness in the l side of face, and arm, and weakness in the L leg.  On exam, only deficit is sensory deficits in the l arm and leg.  Cranial nerves, and motor/gait exam without any deficits.  imaging thus far unremarkable.     -will follow up MRI brain, non contrast  -adding MR cervical spine, non contrast  -cardiac monitoring, and echo.   -please also obtain ESR, CRP  -continue on ASA 81mg daily  -continue on atorvastatin 40mg daily  -PT/OT/SLp  -neurology to follow.  Please page or call with any acute neuro changes, or other issues we can help address.   54 year old woman with cardiac valvulopathy, and HLD, presenting with shoulder and back pain, along with numbness in the l side of face, and arm, and weakness in the L leg.  On exam, only deficit is sensory deficits in the l arm and leg.  Cranial nerves, and motor/gait exam without any deficits.  imaging thus far unremarkable.     -will follow up MRI brain, non contrast  -adding MR cervical spine, non contrast  -cardiac monitoring, and echo.   -please also obtain ESR, CRP  -continue on ASA 81mg daily  -continue on atorvastatin 40mg daily  -PT/OT/SLp  -neurology to follow.  Please page or call with any acute neuro changes, or other issues we can help address.        ADDENDUM:   MRI brain and cervical spine images reviewed: no diffusion restriction.  FLAIR hyperintensity in the L cerebellum, likely chronic infarct.     Discussed results with patient.    Although she has a chronic appearing infarct on MRI, the current presenting symptoms are not related to any acute pathology in the CNS.    Plan to continue ASA and atorvastatin.   Would perform echo, can be performed inpatient or outpatient.    Please page or call neurology with any acute neuro changes.

## 2024-06-13 NOTE — PROGRESS NOTE ADULT - ASSESSMENT
55 y/o F, from home, ambulates independently, with PMHx of HLD and heart valve defect (on ASA), who p/w left arm numbness. Code stroke in ED, with insignificant CT findings. Possible 4mm hemorrhage vs artifact for which neurosurgery Dr. Joyner was consulted and recommended repeat imaging in the morning. Admitted for CVA r/o.

## 2024-06-13 NOTE — PROGRESS NOTE ADULT - SUBJECTIVE AND OBJECTIVE BOX
PGY-1 Progress Note discussed with attending    PAGER #: [[phone]] TILL 5:00 PM  PLEASE CONTACT ON CALL TEAM:  - On Call Team (Please refer to Deedee) FROM 5:00 PM - 8:30PM  - Nightfloat Team FROM 8:30 -7:30 AM    CC: Patient is a 54y old  Female who presents with a chief complaint of CVA r/o (13 Jun 2024 10:31)      OVERNIGHT EVENTS: admit to 5S    SUBJECTIVE / INTERVAL HPI: Patient seen and examined at bedside. Complaining of fatigue and left hand pain when gripping objects.     ROS:  CONSTITUTIONAL: No weakness, fevers or chills  EYES/ENT: No visual changes;  No vertigo or throat pain   NECK: No pain or stiffness  RESPIRATORY: No cough, wheezing, hemoptysis; No shortness of breath  CARDIOVASCULAR: No chest pain or palpitations  GASTROINTESTINAL: No abdominal or epigastric pain. No nausea, vomiting, or hematemesis; No diarrhea or constipation. No melena or hematochezia.  GENITOURINARY: No dysuria, frequency or hematuria  NEUROLOGICAL: No numbness or weakness  SKIN: No itching, burning, rashes, or lesions     VITAL SIGNS:  Vital Signs Last 24 Hrs  T(C): 36.6 (13 Jun 2024 09:48), Max: 36.6 (13 Jun 2024 09:48)  T(F): 97.8 (13 Jun 2024 09:48), Max: 97.8 (13 Jun 2024 09:48)  HR: 76 (13 Jun 2024 09:48) (68 - 89)  BP: 129/90 (13 Jun 2024 09:48) (103/70 - 129/90)  BP(mean): 80 (13 Jun 2024 02:14) (80 - 81)  RR: 18 (13 Jun 2024 09:48) (18 - 18)  SpO2: 94% (13 Jun 2024 09:48) (94% - 100%)    Parameters below as of 13 Jun 2024 09:48  Patient On (Oxygen Delivery Method): room air        PHYSICAL EXAM:    General: WDWN  HEENT: NC/AT; PERRL, anicteric sclera; MMM  Neck: supple  Cardiovascular: +S1/S2; RRR  Respiratory: CTA B/L; no W/R/R  Gastrointestinal: soft, NT/ND; +BSx4  Extremities: WWP; no edema, clubbing or cyanosis  Vascular: 2+ radial, DP/PT pulses B/L  Skin: Warm, dry, good turgor, no rashes, or ecchymoses  Neurological: AAOx3; no focal deficits    MEDICATIONS:  MEDICATIONS  (STANDING):  atorvastatin 40 milliGRAM(s) Oral at bedtime  midazolam Injectable 0.5 milliGRAM(s) IV Push once    MEDICATIONS  (PRN):  acetaminophen     Tablet .. 650 milliGRAM(s) Oral every 6 hours PRN Temp greater or equal to 38C (100.4F), Mild Pain (1 - 3)  aluminum hydroxide/magnesium hydroxide/simethicone Suspension 30 milliLiter(s) Oral every 4 hours PRN Dyspepsia  melatonin 3 milliGRAM(s) Oral at bedtime PRN Insomnia  ondansetron Injectable 4 milliGRAM(s) IV Push every 8 hours PRN Nausea and/or Vomiting      ALLERGIES:  Allergies    No Known Allergies    Intolerances        LABS:                        12.1   4.66  )-----------( 222      ( 13 Jun 2024 05:58 )             36.3     06-13    141  |  109<H>  |  10  ----------------------------<  93  3.9   |  28  |  0.87    Ca    8.7      13 Jun 2024 05:58  Phos  4.2     06-13  Mg     2.3     06-13    TPro  6.5  /  Alb  3.5  /  TBili  0.3  /  DBili  x   /  AST  16  /  ALT  25  /  AlkPhos  102  06-13    PT/INR - ( 12 Jun 2024 20:47 )   PT: 10.8 sec;   INR: 0.95 ratio         PTT - ( 12 Jun 2024 20:47 )  PTT:35.4 sec  Urinalysis Basic - ( 13 Jun 2024 05:58 )    Color: x / Appearance: x / SG: x / pH: x  Gluc: 93 mg/dL / Ketone: x  / Bili: x / Urobili: x   Blood: x / Protein: x / Nitrite: x   Leuk Esterase: x / RBC: x / WBC x   Sq Epi: x / Non Sq Epi: x / Bacteria: x      CAPILLARY BLOOD GLUCOSE      POCT Blood Glucose.: 87 mg/dL (12 Jun 2024 20:52)      RADIOLOGY & ADDITIONAL TESTS: Reviewed. PGY-1 Progress Note discussed with attending    PAGER #: [[phone]] TILL 5:00 PM  PLEASE CONTACT ON CALL TEAM:  - On Call Team (Please refer to Deedee) FROM 5:00 PM - 8:30PM  - Nightfloat Team FROM 8:30 -7:30 AM    CC: Patient is a 54y old  Female who presents with a chief complaint of CVA r/o (13 Jun 2024 10:31)      OVERNIGHT EVENTS: admit to 5S    SUBJECTIVE / INTERVAL HPI: Patient seen and examined at bedside. Complaining of generalized weakness/fatigue and left hand pain when gripping objects. Slight improvement in numbness and tingling in L hand 4th and 5th digits. Denies any chest tightness, palpitations or diplopia since yesterday.     ROS:  CONSTITUTIONAL: +fatigue/weakness, no fevers or chills  EYES/ENT: No visual changes;  No vertigo or throat pain   NECK: No pain or stiffness  RESPIRATORY: No cough, wheezing, hemoptysis; No shortness of breath  CARDIOVASCULAR: No chest pain or palpitations  GASTROINTESTINAL: No abdominal or epigastric pain. No nausea, vomiting, or hematemesis; No diarrhea or constipation. No melena or hematochezia.  GENITOURINARY: No dysuria, frequency or hematuria  NEUROLOGICAL: +L hand numbness +L hand weakness  SKIN: No itching, burning, rashes, or lesions     VITAL SIGNS:  Vital Signs Last 24 Hrs  T(C): 36.6 (13 Jun 2024 09:48), Max: 36.6 (13 Jun 2024 09:48)  T(F): 97.8 (13 Jun 2024 09:48), Max: 97.8 (13 Jun 2024 09:48)  HR: 76 (13 Jun 2024 09:48) (68 - 89)  BP: 129/90 (13 Jun 2024 09:48) (103/70 - 129/90)  BP(mean): 80 (13 Jun 2024 02:14) (80 - 81)  RR: 18 (13 Jun 2024 09:48) (18 - 18)  SpO2: 94% (13 Jun 2024 09:48) (94% - 100%)    Parameters below as of 13 Jun 2024 09:48  Patient On (Oxygen Delivery Method): room air        PHYSICAL EXAM:    General: WDWN  HEENT: NC/AT; PERRL, EOMI, anicteric sclera; MMM  Neck: supple  Cardiovascular: +S1/S2; RRR  Respiratory: CTA B/L; no W/R/R  Gastrointestinal: soft, NT/ND; +BSx4  Extremities: WWP; no edema, clubbing or cyanosis  Vascular: 2+ radial, DP/PT pulses B/L  Skin: Warm, dry, good turgor, no rashes, or ecchymoses  Neurological: General / Mental Status: AAO x 3.  No aphasia or dysarthria.  Naming and repetition intact.  Cranial Nerves: VFF x 4.  PERRL.  EOMI x 2, No nystagmus or diplopia.  diminished sensation to light touch in L V1 distribution.  Symmetric facial movement and palate elevation.  B/l hearing equal to finger rub.  3/5 strength with l sternocleidomastoid and L trapezius, 4+/5 strength on R.  Midline tongue protrusion, with no atrophy or fasciculations.  Motor: Normal bulk & tone in all four extremities.  3/5 L  strength, 4/5 strength throughout all four extremities.  No downward drift, rigidity, spasticity, or tremors in any of the four extremities.  Sensory: diminished to light touch in LUE and hand, Rest Intact to light in all four extremities. Romberg not tested  Reflex: 2+ and symmetric at b/l biceps, brachioradialis, patellae.  Downgoing toes b/l.  Coordination: No dysmetria with b/l finger-to-nose however notable psychomotor slowing.    Gait: not tested      MEDICATIONS:  MEDICATIONS  (STANDING):  atorvastatin 40 milliGRAM(s) Oral at bedtime  midazolam Injectable 0.5 milliGRAM(s) IV Push once    MEDICATIONS  (PRN):  acetaminophen     Tablet .. 650 milliGRAM(s) Oral every 6 hours PRN Temp greater or equal to 38C (100.4F), Mild Pain (1 - 3)  aluminum hydroxide/magnesium hydroxide/simethicone Suspension 30 milliLiter(s) Oral every 4 hours PRN Dyspepsia  melatonin 3 milliGRAM(s) Oral at bedtime PRN Insomnia  ondansetron Injectable 4 milliGRAM(s) IV Push every 8 hours PRN Nausea and/or Vomiting      ALLERGIES:  Allergies    No Known Allergies    Intolerances        LABS:                        12.1   4.66  )-----------( 222      ( 13 Jun 2024 05:58 )             36.3     06-13    141  |  109<H>  |  10  ----------------------------<  93  3.9   |  28  |  0.87    Ca    8.7      13 Jun 2024 05:58  Phos  4.2     06-13  Mg     2.3     06-13    TPro  6.5  /  Alb  3.5  /  TBili  0.3  /  DBili  x   /  AST  16  /  ALT  25  /  AlkPhos  102  06-13    PT/INR - ( 12 Jun 2024 20:47 )   PT: 10.8 sec;   INR: 0.95 ratio         PTT - ( 12 Jun 2024 20:47 )  PTT:35.4 sec  Urinalysis Basic - ( 13 Jun 2024 05:58 )    Color: x / Appearance: x / SG: x / pH: x  Gluc: 93 mg/dL / Ketone: x  / Bili: x / Urobili: x   Blood: x / Protein: x / Nitrite: x   Leuk Esterase: x / RBC: x / WBC x   Sq Epi: x / Non Sq Epi: x / Bacteria: x      CAPILLARY BLOOD GLUCOSE      POCT Blood Glucose.: 87 mg/dL (12 Jun 2024 20:52)      RADIOLOGY & ADDITIONAL TESTS:   < from: MR Head No Cont (06.13.24 @ 12:47) >  ACC: [id number] EXAM:  MR SPINE CERVICAL   ORDERED BY:  MARY RYAN     ACC: [id number] EXAM:  MR BRAIN   ORDERED BY: RENETTA MÉNDEZ     PROCEDURE DATE:  06/13/2024          INTERPRETATION:  EXAM: MRI OF THE BRAIN AND CERVICAL SPINE WITHOUT   CONTRAST    HISTORY: Hypertension, possible TIA, evaluation for stroke    TECHNIQUE: Multi-planar multi-sequential MR imaging of the brain and   cervical spine was performed without intravenous contrast.    COMPARISON: CT of the head with CTA head and neckJune 12, 2024.    FINDINGS:    MRI BRAIN:  No diffusion restriction to suggest acute infarct. No hydrocephalus. Few   nonspecific T2/FLAIR hyperintense foci in the deep and periventricular   white matter, likely related to chronic small vessel disease. Small   chronic infarcts in the left cerebellar hemisphere and bilateral caudate   heads, right greater than left. Chronic infarcts versus prominent   perivascular spaces in the bilateral cerebral peduncles. The visualized   extra axial spaces and basal cisterns are within normal limits. No   midline shift or mass effect present.    The craniocervical junction is within normal limits. The pituitary is   unremarkable. The major intracranial vessels demonstrate the expected   signal void related to vascular flow. Mild mucosal thickening in the   ethmoid air cells and maxillary sinuses. The mastoid air cells are well   aerated. The visualized orbits are within normal limits.    MRI CERVICAL SPINE:  Straightening of the cervical lordosis. Normal alignment of the cervical   vertebrae. The vertebral bodies have the appropriate height and MR   signal. The intervertebral discs have the appropriate height and MR   signal.    Visualized portions of the posterior fossa appear within normal limits.  The medulla and cervical spinal cord have the appropriate caliber. Faint   increased T2 signal in the central cervical spinal cord from levels of   approximately C5-T1, best visualized on sagittal imaging, not well   replicated on STIR sequence, likely artifactual/Horan artifact.    C2-C3: No large disc bulge. The bilateral neuroforamina are patent. No   significant narrowing of the spinal canal.    C3-C4: No large disc bulge. Bilateral neuroforamina are patent. No   significant narrowing of the spinal canal.    C4-C5: No large disc bulge. The bilateral neuroforamina are patent. No   significant narrowing of the spinal canal.    C5-C6: Small disc bulge. The bilateral neuroforamina are patent. No   significant narrowing of the spinal canal.    C6-C7: Small disc bulge. The bilateral neuroforamina are patent. No   significant narrowing of the spinal canal.    C7-T1: No large disc bulge. The bilateral neuroforamina are patent. No   significant narrowing of the spinal canal.    The paraspinal muscles are within normal limits.        IMPRESSION:    MRI BRAIN:  1.  No evidence of acute infarct or midline shift.  2.  Chronic ischemic changes as discussed above.    MRI CERVICAL SPINE:  1.  No evidence of critical narrowing of the spinal canal.  2.  Mild degenerative changes in the cervical spine.    < end of copied text >

## 2024-06-13 NOTE — PATIENT PROFILE ADULT - FALL HARM RISK - HARM RISK INTERVENTIONS

## 2024-06-14 VITALS
DIASTOLIC BLOOD PRESSURE: 58 MMHG | TEMPERATURE: 99 F | RESPIRATION RATE: 18 BRPM | HEART RATE: 80 BPM | OXYGEN SATURATION: 97 % | SYSTOLIC BLOOD PRESSURE: 90 MMHG

## 2024-06-14 LAB
CRP SERPL-MCNC: <3 MG/L — SIGNIFICANT CHANGE UP
ERYTHROCYTE [SEDIMENTATION RATE] IN BLOOD: 12 MM/HR — SIGNIFICANT CHANGE UP (ref 0–20)

## 2024-06-14 PROCEDURE — 84100 ASSAY OF PHOSPHORUS: CPT

## 2024-06-14 PROCEDURE — 72141 MRI NECK SPINE W/O DYE: CPT | Mod: MC

## 2024-06-14 PROCEDURE — 70498 CT ANGIOGRAPHY NECK: CPT | Mod: MC

## 2024-06-14 PROCEDURE — 70450 CT HEAD/BRAIN W/O DYE: CPT | Mod: MC

## 2024-06-14 PROCEDURE — 71260 CT THORAX DX C+: CPT | Mod: MC

## 2024-06-14 PROCEDURE — 84484 ASSAY OF TROPONIN QUANT: CPT

## 2024-06-14 PROCEDURE — 84702 CHORIONIC GONADOTROPIN TEST: CPT

## 2024-06-14 PROCEDURE — 80061 LIPID PANEL: CPT

## 2024-06-14 PROCEDURE — 99239 HOSP IP/OBS DSCHRG MGMT >30: CPT | Mod: GC

## 2024-06-14 PROCEDURE — 0042T: CPT | Mod: MC

## 2024-06-14 PROCEDURE — 86140 C-REACTIVE PROTEIN: CPT

## 2024-06-14 PROCEDURE — 93325 DOPPLER ECHO COLOR FLOW MAPG: CPT

## 2024-06-14 PROCEDURE — 93321 DOPPLER ECHO F-UP/LMTD STD: CPT

## 2024-06-14 PROCEDURE — 85730 THROMBOPLASTIN TIME PARTIAL: CPT

## 2024-06-14 PROCEDURE — 80053 COMPREHEN METABOLIC PANEL: CPT

## 2024-06-14 PROCEDURE — 36415 COLL VENOUS BLD VENIPUNCTURE: CPT

## 2024-06-14 PROCEDURE — 93005 ELECTROCARDIOGRAM TRACING: CPT

## 2024-06-14 PROCEDURE — 96374 THER/PROPH/DIAG INJ IV PUSH: CPT

## 2024-06-14 PROCEDURE — 83036 HEMOGLOBIN GLYCOSYLATED A1C: CPT

## 2024-06-14 PROCEDURE — 70496 CT ANGIOGRAPHY HEAD: CPT | Mod: MC

## 2024-06-14 PROCEDURE — 85652 RBC SED RATE AUTOMATED: CPT

## 2024-06-14 PROCEDURE — 70551 MRI BRAIN STEM W/O DYE: CPT | Mod: MC

## 2024-06-14 PROCEDURE — 74177 CT ABD & PELVIS W/CONTRAST: CPT | Mod: MC

## 2024-06-14 PROCEDURE — T1013: CPT

## 2024-06-14 PROCEDURE — 85610 PROTHROMBIN TIME: CPT

## 2024-06-14 PROCEDURE — 82962 GLUCOSE BLOOD TEST: CPT

## 2024-06-14 PROCEDURE — 84443 ASSAY THYROID STIM HORMONE: CPT

## 2024-06-14 PROCEDURE — 93308 TTE F-UP OR LMTD: CPT

## 2024-06-14 PROCEDURE — 85025 COMPLETE CBC W/AUTO DIFF WBC: CPT

## 2024-06-14 PROCEDURE — 83735 ASSAY OF MAGNESIUM: CPT

## 2024-06-14 PROCEDURE — 99285 EMERGENCY DEPT VISIT HI MDM: CPT

## 2024-06-14 RX ORDER — ASPIRIN/CALCIUM CARB/MAGNESIUM 324 MG
1 TABLET ORAL
Qty: 30 | Refills: 0
Start: 2024-06-14 | End: 2024-07-13

## 2024-06-14 RX ORDER — ATORVASTATIN CALCIUM 80 MG/1
1 TABLET, FILM COATED ORAL
Refills: 0 | DISCHARGE

## 2024-06-14 RX ORDER — ATORVASTATIN CALCIUM 80 MG/1
1 TABLET, FILM COATED ORAL
Qty: 30 | Refills: 0
Start: 2024-06-14 | End: 2024-07-13

## 2024-06-14 RX ORDER — ASPIRIN/CALCIUM CARB/MAGNESIUM 324 MG
1 TABLET ORAL
Refills: 0 | DISCHARGE

## 2024-06-14 RX ADMIN — Medication 81 MILLIGRAM(S): at 12:12

## 2024-06-14 NOTE — DISCHARGE NOTE NURSING/CASE MANAGEMENT/SOCIAL WORK - PATIENT PORTAL LINK FT
You can access the FollowMyHealth Patient Portal offered by [facility] by registering at the following website: [email or web address]. By joining [facility]’s FollowMyHealth portal, you will also be able to view your health information using other applications (apps) compatible with our system.

## 2024-06-14 NOTE — DISCHARGE NOTE PROVIDER - NSDCMRMEDTOKEN_GEN_ALL_CORE_FT
aspirin 81 mg oral tablet, chewable: 1 tab(s) chewed once a day  atorvastatin 40 mg oral tablet: 1 tab(s) orally once a day (at bedtime)

## 2024-06-14 NOTE — DISCHARGE NOTE PROVIDER - ATTENDING DISCHARGE PHYSICAL EXAMINATION:
Discussed via  Carleen [id number]  Patient reports complete improvement of her symptoms. MRI had no findings. patient has cardiology followup in August, has no lower extremity swelling, no SOB no ches tpain. heart regular + murmur lungs clear, abd soft nontneder

## 2024-06-14 NOTE — DISCHARGE NOTE PROVIDER - HOSPITAL COURSE
Patient is a 55 y/o F, from home, ambulates independently, with PMHx of HLD and cardiac valvulopathy NOS (on ASA) who presented with left arm numbness. Pt stated she was in her normal state of health the evening of admission when she began having upper back pain along her spine that then radiated to her arm associated with a hot sensation her left arm, posterior shoulder rib cage, and flank. About 1 hour later, she began experiencing a tingling sensation in the L side of the face, and the L arm. Pt noted she had this warm pain sensation in L hand along the pinky side, up to her third finger and affected her  strength noted when trying to hold her phone. At the same time, she found she was having difficulty walking, and felt the strength in the L leg was weak compared to the R prompting her to come to the ED. Pt mentioned she had some associated double vision and chest tightness during the event. Pt denied any recent travel, recent illness, recent trauma, sick contacts, fever, chills, N/V/D, constipation, and SOB. In the ED: NIHSS 2, code stroke called. Vital signs stable and labs were unremarkable. s/p CT head and neck stroke protocol. CT Head, brain perfusion showed a vague 4 mm hyperdensity in the right midbrain/cerebral peduncle is of uncertain etiology with possibilities including a tiny focus of acute hemorrhage, dystrophic calcification or cavernoma and cannot entirely excluded it being artifactual. No definite acute hemorrhage, vasogenic edema or extra-axial collection. Admitted to telemetry for CVA rule out. Aspirin held prior to MR brain out of caution for finding on CT representing possible tiny focus of acute hemorrhage. Neurology Dr. Blanchard consulted. Neuro recommended MR C-spine in addition to MR brain. Overnight into 6/13, the pain and heat in the trunk have resolved, however, she continued to report some sensory and motor symptoms on the L.  Her most concerning complaint was a sense of extreme fatigue. Patient had MR brain and C spine which showed-- MR brain: 1.  No evidence of acute infarct or midline shift. 2.  Chronic ischemic changes. MR C-spine: 1. No evidence of critical narrowing of the spinal canal. 2. Mild degenerative changes in the c-spine. Restarted on ASA and continued on home statin. A1c 6.0 and Lipid Panel WNL. TTE showed no evidence of intracardiac shunt, EF 67%, Grade 1 diastolic dysfunction, and moderate aortic stenosis w/ mild aortic insufficiency. EKG showed NSR and no events abnormal events recorded on telemetry. Patients symptoms resolved on 6/14.   Patient is stable for discharge and is advised to follow up with PCP as outpatient.  Please refer to patient's complete medical chart with documents for a full hospital course, for this is only a brief summary.          Patient is a 53 y/o F, from home, ambulates independently, with PMHx of HLD and cardiac valvulopathy NOS (on ASA) who presented with left arm numbness. Pt stated she was in her normal state of health the evening of admission when she began having upper back pain along her spine that then radiated to her arm associated with a hot sensation her left arm, posterior shoulder rib cage, and flank. About 1 hour later, she began experiencing a tingling sensation in the L side of the face, and the L arm. Pt noted she had this warm pain sensation in L hand along the pinky side, up to her third finger and affected her  strength noted when trying to hold her phone. At the same time, she found she was having difficulty walking, and felt the strength in the L leg was weak compared to the R prompting her to come to the ED. Pt mentioned she had some associated double vision and chest tightness during the event. Pt denied any recent travel, recent illness, recent trauma, sick contacts, fever, chills, N/V/D, constipation, and SOB. In the ED: NIHSS 2, code stroke called. Vital signs stable and labs were unremarkable. s/p CT head and neck stroke protocol. CT Head, brain perfusion showed a vague 4 mm hyperdensity in the right midbrain/cerebral peduncle is of uncertain etiology with possibilities including a tiny focus of acute hemorrhage, dystrophic calcification or cavernoma and cannot entirely excluded it being artifactual. No definite acute hemorrhage, vasogenic edema or extra-axial collection. Admitted to telemetry for CVA rule out. Aspirin held prior to MR brain out of caution for finding on CT representing possible tiny focus of acute hemorrhage. Neurology Dr. Blanchard consulted. Neuro recommended MR C-spine in addition to MR brain. Overnight into 6/13, the pain and heat in the trunk have resolved, however, she continued to report some sensory and motor symptoms on the L.  Her most concerning complaint was a sense of extreme fatigue. Patient had MR brain and C spine which showed-- MR brain: 1.  No evidence of acute infarct or midline shift. 2.  Chronic ischemic changes. MR C-spine: 1. No evidence of critical narrowing of the spinal canal. 2. Mild degenerative changes in the c-spine. Restarted on ASA and continued on home statin. A1c 6.0 and Lipid Panel WNL. TTE showed no evidence of intracardiac shunt, EF 67%, Grade 1 diastolic dysfunction, and moderate aortic stenosis w/ mild aortic insufficiency. Advised to follow up with outpatient cardiologist as patient already has appointment scheduled for august. EKG showed NSR and no events abnormal events recorded on telemetry. Patients symptoms resolved as of 6/14.   Patient is stable for discharge and is advised to follow up with PCP as outpatient.  Please refer to patient's complete medical chart with documents for a full hospital course, for this is only a brief summary.          Patient is a 53 y/o F, from home, ambulates independently, with PMHx of HLD and cardiac valvulopathy NOS (on ASA) who presented with left arm numbness. Pt stated she was in her normal state of health the evening of admission when she began having upper back pain along her spine that then radiated to her arm associated with a hot sensation her left arm, posterior shoulder rib cage, and flank. About 1 hour later, she began experiencing a tingling sensation in the L side of the face, and the L arm. Pt noted she had this warm pain sensation in L hand along the pinky side, up to her third finger and affected her  strength noted when trying to hold her phone. At the same time, she found she was having difficulty walking, and felt the strength in the L leg was weak compared to the R prompting her to come to the ED. Pt mentioned she had some associated double vision and chest tightness during the event. In the ED: NIHSS 2, code stroke called. Vital signs stable and labs were unremarkable. s/p CT head and neck stroke protocol. CT Head, brain perfusion showed a vague 4 mm hyperdensity in the right midbrain/cerebral peduncle is of uncertain etiology with possibilities including a tiny focus of acute hemorrhage, dystrophic calcification or cavernoma and cannot entirely excluded it being artifactual. No definite acute hemorrhage, vasogenic edema or extra-axial collection. Admitted to telemetry for CVA rule out. Aspirin held prior to MR brain out of caution for finding on CT representing possible tiny focus of acute hemorrhage. Neurology Dr. Blanchard consulted. Neuro recommended MR C-spine in addition to MR brain. Overnight into 6/13, the pain and heat in the trunk have resolved, however, she continued to report some sensory and motor symptoms on the L.  Her most concerning complaint was a sense of extreme fatigue. Patient had MR brain and C spine which showed-- MR brain: 1.  No evidence of acute infarct or midline shift. 2.  Chronic ischemic changes. MR C-spine: 1. No evidence of critical narrowing of the spinal canal. 2. Mild degenerative changes in the c-spine. Restarted on ASA and continued on home statin. A1c 6.0 and Lipid Panel WNL. TTE showed no evidence of intracardiac shunt, EF 67%, Grade 1 diastolic dysfunction, and moderate aortic stenosis w/ mild aortic insufficiency. Advised to follow up with outpatient cardiologist as patient already has appointment scheduled for august. EKG showed NSR and no events abnormal events recorded on telemetry. Patients symptoms resolved as of 6/14.   P    Patient is stable for discharge and is advised to follow up with PCP and cardiology as outpatient. Please refer to patient's complete medical chart with documents for a full hospital course, for this is only a brief summary.

## 2024-06-14 NOTE — DISCHARGE NOTE PROVIDER - NSDCCPCAREPLAN_GEN_ALL_CORE_FT
PRINCIPAL DISCHARGE DIAGNOSIS  Diagnosis: Myofascial pain with referred pain  Assessment and Plan of Treatment: You presented to the hospital complaining of pain and weakness on your left side, specifically in your left arm and hand that was associated with muscle tenderness in your upper back and lower neck. You were admitted to rule out neurological causes of your syptoms like acute ischemic stroke or hemorrhage. You were admitted to telemetry to continuously monitor your heart rate and rhythm as certain arrhythmias can increase your chance of stroke. No abnormal heart rhythms were observed on EKG or telemetry. A CT scan of your head was indeterminant for acute stroke or bleed so a follow up MRI scan of your brain and upper spine were performed. MRI was normal and showed no evidence of acute cerebral vascular causes to explain your symptoms. There was evidence of chronic microvascular ischemic changes which are often seen as a result of normal aging. Neurology was consulted and recommended continuing ASPIRIN 81MG DAILY and ATORVASTATIN 20MG ONCE A DAY AT BEDTIME to reduce your risk of stroke in the future. It is likely that your symptoms were due to nerve irritation and/or compression as a result of muscle strain in your upper back that resulted in pain and sensory changes in distant parts of the body like your arm and hand. Limit strenuous exercise or heavy lifting of your upper extremities for the next few days to weeks as not to aggrivate the likely muscle strain in your upper back. If symptoms recur you can take over the counter ibuprofen or tylenol as needed (do not take more than directed per packaging). Please continue to take your home medications as PRESCRIBED and follow up with your primary care doctor in a week from discharge to adjust medications as needed and discuss further management.        SECONDARY DISCHARGE DIAGNOSES  Diagnosis: Moderate aortic stenosis  Assessment and Plan of Treatment: You reported a history of abnormal heart valves. An echocardiogram on this admission as a part of your work up for possible stroke showed an normal pumping function with an ejection fraction of 67%, grade 1 diastolic dysfunction (mildly impaired heat muscle relaxtion), and MODERATE AORTIC STENOSIS with MILD AORTIC INSUFFICIENCY,    Diagnosis: HLD (hyperlipidemia)  Assessment and Plan of Treatment:      PRINCIPAL DISCHARGE DIAGNOSIS  Diagnosis: Myofascial pain with referred pain  Assessment and Plan of Treatment: You presented to the hospital complaining of pain and weakness on your left side, specifically in your left arm and hand that was associated with muscle tenderness in your upper back and lower neck. You were admitted to rule out neurological causes of your syptoms like acute ischemic stroke or hemorrhage. You were admitted to telemetry to continuously monitor your heart rate and rhythm as certain arrhythmias can increase your chance of stroke. No abnormal heart rhythms were observed on EKG or telemetry. A CT scan of your head was indeterminant for acute stroke or bleed so a follow up MRI scan of your brain and upper spine were performed. MRI was normal and showed no evidence of acute cerebral vascular causes to explain your symptoms. There was evidence of chronic microvascular ischemic changes which are often seen as a result of normal aging. Neurology was consulted and recommended continuing ASPIRIN 81MG DAILY and ATORVASTATIN 40MG ONCE A DAY AT BEDTIME to reduce your risk of stroke in the future. It is likely that your symptoms were due to nerve irritation and/or compression as a result of muscle strain in your upper back that resulted in pain and sensory changes in distant parts of the body like your arm and hand.  UPON DISCHARGE PLEASE CONTINUE TO TAKE ATORVASTATIN 40 MG ORAL ONCE A DAY AND ASPIRIN 81 MG ORAL DAILY. Limit strenuous exercise or heavy lifting of your upper extremities for the next few days to weeks as not to aggrivate the likely muscle strain in your upper back. If symptoms recur you can take over the counter ibuprofen or tylenol as needed (do not take more than directed per packaging). Please follow up with your primary care doctor in 1 week from discharge to adjust medications as needed and discuss further management.        SECONDARY DISCHARGE DIAGNOSES  Diagnosis: Moderate aortic stenosis  Assessment and Plan of Treatment: You reported a history of abnormal heart valves. An echocardiogram on this admission as a part of your work up for possible stroke showed an normal pumping function with an ejection fraction of 67%, grade 1 diastolic dysfunction (mildly impaired heat muscle relaxation), and MODERATE AORTIC STENOSIS with MILD AORTIC INSUFFICIENCY. Aortic stenosis means that the aortic valve has some degree of obstruction when your heart is trying to pump blood out. Aortic insufficiency means that when blood is ejected out of your heart into the major vessels, there is back flow to the heart. Both conditions need to be monitored for progression by a cardiologist.   Upon discharge please follow up with a cardiologist in 1-2 weeks from discharge to monitor your symptoms and monitor for progression for your valvular conditions. You can follow up with Dr. Love as outpatient, his contact information has been provided on this document.      Diagnosis: HLD (hyperlipidemia)  Assessment and Plan of Treatment: You have history of elevated cholesterol levels. We obtained a lipid panel during this admission which was normal, which implies that your current medication is appropriate.  We continued your home medication of atorvastatin 40 mg oral daily   Upon discharge please continue to take atorvastatin 40 mg oral daily as directed, comply to a diet low in fats  and follow up with your PCP on a regular basis to monitor your cholesterol levels and adjust your medication as needed.     PRINCIPAL DISCHARGE DIAGNOSIS  Diagnosis: Myofascial pain with referred pain  Assessment and Plan of Treatment: You presented to the hospital complaining of pain and weakness on your left side, specifically in your left arm and hand that was associated with muscle tenderness in your upper back and lower neck. You were admitted to rule out neurological causes of your syptoms like acute ischemic stroke or hemorrhage. You were admitted to telemetry to continuously monitor your heart rate and rhythm as certain arrhythmias can increase your chance of stroke. No abnormal heart rhythms were observed on EKG or telemetry. A CT scan of your head was indeterminant for acute stroke or bleed so a follow up MRI scan of your brain and upper spine were performed. MRI was normal and showed no evidence of acute cerebral vascular causes to explain your symptoms. There was evidence of chronic microvascular ischemic changes which are often seen as a result of normal aging. Neurology was consulted and recommended continuing ASPIRIN 81MG DAILY and ATORVASTATIN 40MG ONCE A DAY AT BEDTIME to reduce your risk of stroke in the future. It is likely that your symptoms were due to nerve irritation and/or compression as a result of muscle strain in your upper back that resulted in pain and sensory changes in distant parts of the body like your arm and hand.  UPON DISCHARGE PLEASE CONTINUE TO TAKE ATORVASTATIN 40 MG ORAL ONCE A DAY AND ASPIRIN 81 MG ORAL DAILY. Limit strenuous exercise or heavy lifting of your upper extremities for the next few days to weeks as not to aggrivate the likely muscle strain in your upper back. If symptoms recur you can take over the counter ibuprofen or tylenol as needed (do not take more than directed per packaging). Please follow up with your primary care doctor in 1 week from discharge to adjust medications as needed and discuss further management.        SECONDARY DISCHARGE DIAGNOSES  Diagnosis: Moderate aortic stenosis  Assessment and Plan of Treatment: You reported a history of abnormal heart valves. An echocardiogram on this admission as a part of your work up for possible stroke showed an normal pumping function with an ejection fraction of 67%, grade 1 diastolic dysfunction (mildly impaired heat muscle relaxation), and MODERATE AORTIC STENOSIS with MILD AORTIC INSUFFICIENCY. Aortic stenosis means that the aortic valve has some degree of obstruction when your heart is trying to pump blood out. Aortic insufficiency means that when blood is ejected out of your heart into the major vessels, there is back flow to the heart. Both conditions need to be monitored for progression by a cardiologist.   Upon discharge please follow up with a cardiologist in 1-2 weeks from discharge to monitor your symptoms and monitor for progression for your valvular conditions. You can follow up with Dr. Love as outpatient, his contact information has been provided on this document.      Diagnosis: HLD (hyperlipidemia)  Assessment and Plan of Treatment: You have history of elevated cholesterol levels. We obtained a lipid panel during this admission which was normal, which implies that your current medication is appropriate.  We continued your home medication of atorvastatin 40 mg oral daily   Upon discharge please continue to take atorvastatin 40 mg oral daily as directed, comply to a diet low in fats  and follow up with your PCP on a regular basis to monitor your cholesterol levels and adjust your medication as needed.    Diagnosis: Pre-diabetes  Assessment and Plan of Treatment: Your hemoglobin A1C which is a measure of how well your blood sugar has been controlled over the last 3 months was found to be 6.0 which is consistent with pre-diabetes. It is highly recommended that you follow a diet low in processed carbohydrates and fats. Eat more fruits, vegetable, and lean meats. Also it is recommended that you engage in physical activity as tolerated. Please follow up with your PCP to monitor your A1C levels.

## 2024-06-14 NOTE — DISCHARGE NOTE PROVIDER - CARE PROVIDER_API CALL
Reggie Love  Cardiovascular Disease  [address]  [place] [address]  Phone: [phone]  Fax: [phone]  Follow Up Time: 2 weeks    [facility]  [address] [place] [address]  Phone: [phone]  Fax: [phone]  Established Patient  Follow Up Time: 1 week

## 2024-06-14 NOTE — DISCHARGE NOTE PROVIDER - PROVIDER TOKENS
PROVIDER:[TOKEN:[[id number]],FOLLOWUP:[2 weeks]],PROVIDER:[TOKEN:[[id number]],FOLLOWUP:[1 week],ESTABLISHEDPATIENT:[T]]

## 2024-06-14 NOTE — DISCHARGE NOTE NURSING/CASE MANAGEMENT/SOCIAL WORK - NSDCPEFALRISK_GEN_ALL_CORE
For information on Fall & Injury Prevention, visit: [email or web address] OR  [email or web address] OR  https://www.cdc.gov/steadi/patient.html

## 2024-06-14 NOTE — DISCHARGE NOTE NURSING/CASE MANAGEMENT/SOCIAL WORK - NSFLUVACAGEDISCH_IMM_ALL_CORE
Daily Note     Today's date: 2021  Patient name: Kary Munoz  : 1953  MRN: [id number]  Referring provider: Meaghan Sun  Dx:   Encounter Diagnosis     ICD-10-CM    1  Left shoulder pain, unspecified chronicity  M25 512    2  Right shoulder pain, unspecified chronicity  M25 511                   Subjective: Pt reports that she has felt improvements in her shoulder pain more recently and able to revisit some of the stretching that she had been doing prior  Objective: See treatment diary below      Assessment: Tolerated treatment well  Patient would benefit from continued PT  Pt with continued irritation in regions being manually treated today  Able to get to springy end feel at 140* flexion as opposed to empty end feel prior  Plan: Continue per plan of care  Precautions: NA      Manuals 5/14 3/12 3/19 3/26 4/2 4/9 4/16 4/23 4/30 5/7   1 Rib, clavicle border, pec, serratus, subscap CB  PROM+STM JW JL JL JL VR JL JL JL CB   SL scap mobs, ER/IR MWM CB  JL JL JL VR JL JL JL CB   Thoracic HVLA   JL          T1 shear MWM CB      JL JL JL CB   LUE nerve tensioning, median bias         JL    Neuro Re-Ed             UT, levator S bilaterally  30"x3 ea     home       TB ER, low trap  RTB x15  RTB x15  RTB  x15 RTB x15      Horizontal ABD  RTB x15 RTB x15 RTB 15x  RTB  x15       Row  RTB x15  BTB x15  BTB  x20       Shoulder extension   RTB x15  BTB 15x  BTB  x15       Wall clocks   RTB 5x RTB 5x ea         Levator, subscap, Serratus MWM   5'          Repeated retractio             Education      sleeping posture, HEP 15' reviewed sleeping posture and HEP   HEP reivew     Ther Ex             Table slides flexion 15x10:                                                                                                        Ther Activity                                       Gait Training                                       Modalities Adult
